# Patient Record
Sex: MALE | Race: WHITE | NOT HISPANIC OR LATINO | URBAN - METROPOLITAN AREA
[De-identification: names, ages, dates, MRNs, and addresses within clinical notes are randomized per-mention and may not be internally consistent; named-entity substitution may affect disease eponyms.]

---

## 2018-03-04 ENCOUNTER — INPATIENT (INPATIENT)
Facility: HOSPITAL | Age: 48
LOS: 1 days | Discharge: HOME | End: 2018-03-06
Attending: INTERNAL MEDICINE | Admitting: INTERNAL MEDICINE

## 2018-03-04 VITALS
OXYGEN SATURATION: 98 % | SYSTOLIC BLOOD PRESSURE: 110 MMHG | TEMPERATURE: 98 F | RESPIRATION RATE: 18 BRPM | DIASTOLIC BLOOD PRESSURE: 62 MMHG | HEART RATE: 78 BPM

## 2018-03-04 DIAGNOSIS — Z98.890 OTHER SPECIFIED POSTPROCEDURAL STATES: Chronic | ICD-10-CM

## 2018-03-04 LAB
ALBUMIN SERPL ELPH-MCNC: 4.1 G/DL — SIGNIFICANT CHANGE UP (ref 3–5.5)
ALP SERPL-CCNC: 34 U/L — SIGNIFICANT CHANGE UP (ref 30–115)
ALT FLD-CCNC: 33 U/L — SIGNIFICANT CHANGE UP (ref 0–41)
ANION GAP SERPL CALC-SCNC: 7 MMOL/L — SIGNIFICANT CHANGE UP (ref 7–14)
AST SERPL-CCNC: 27 U/L — SIGNIFICANT CHANGE UP (ref 0–41)
BASOPHILS # BLD AUTO: 0.05 K/UL — SIGNIFICANT CHANGE UP (ref 0–0.2)
BASOPHILS NFR BLD AUTO: 0.7 % — SIGNIFICANT CHANGE UP (ref 0–1)
BILIRUB SERPL-MCNC: 1 MG/DL — SIGNIFICANT CHANGE UP (ref 0.2–1.2)
BUN SERPL-MCNC: 9 MG/DL — LOW (ref 10–20)
CALCIUM SERPL-MCNC: 9.1 MG/DL — SIGNIFICANT CHANGE UP (ref 8.5–10.1)
CHLORIDE SERPL-SCNC: 104 MMOL/L — SIGNIFICANT CHANGE UP (ref 98–110)
CK MB BLD-MCNC: 1 % — SIGNIFICANT CHANGE UP (ref 0–4)
CK MB CFR SERPL CALC: 0.9 NG/ML — SIGNIFICANT CHANGE UP (ref 0.6–6.3)
CK MB CFR SERPL CALC: 1 NG/ML — SIGNIFICANT CHANGE UP (ref 0.6–6.3)
CK SERPL-CCNC: 114 U/L — SIGNIFICANT CHANGE UP (ref 0–225)
CO2 SERPL-SCNC: 28 MMOL/L — SIGNIFICANT CHANGE UP (ref 17–32)
CREAT SERPL-MCNC: 1 MG/DL — SIGNIFICANT CHANGE UP (ref 0.7–1.5)
EOSINOPHIL # BLD AUTO: 0.05 K/UL — SIGNIFICANT CHANGE UP (ref 0–0.7)
EOSINOPHIL NFR BLD AUTO: 0.7 % — SIGNIFICANT CHANGE UP (ref 0–8)
GLUCOSE SERPL-MCNC: 108 MG/DL — SIGNIFICANT CHANGE UP (ref 70–110)
HCT VFR BLD CALC: 43.4 % — SIGNIFICANT CHANGE UP (ref 42–52)
HGB BLD-MCNC: 15.2 G/DL — SIGNIFICANT CHANGE UP (ref 14–18)
IMM GRANULOCYTES NFR BLD AUTO: 0.3 % — SIGNIFICANT CHANGE UP (ref 0.1–0.3)
LYMPHOCYTES # BLD AUTO: 1.66 K/UL — SIGNIFICANT CHANGE UP (ref 1.2–3.4)
LYMPHOCYTES # BLD AUTO: 24 % — SIGNIFICANT CHANGE UP (ref 20.5–51.1)
MCHC RBC-ENTMCNC: 29.9 PG — SIGNIFICANT CHANGE UP (ref 27–31)
MCHC RBC-ENTMCNC: 35 G/DL — SIGNIFICANT CHANGE UP (ref 32–37)
MCV RBC AUTO: 85.4 FL — SIGNIFICANT CHANGE UP (ref 80–94)
MONOCYTES # BLD AUTO: 0.6 K/UL — SIGNIFICANT CHANGE UP (ref 0.1–0.6)
MONOCYTES NFR BLD AUTO: 8.7 % — SIGNIFICANT CHANGE UP (ref 1.7–9.3)
NEUTROPHILS # BLD AUTO: 4.55 K/UL — SIGNIFICANT CHANGE UP (ref 1.4–6.5)
NEUTROPHILS NFR BLD AUTO: 65.6 % — SIGNIFICANT CHANGE UP (ref 42.2–75.2)
PLATELET # BLD AUTO: 248 K/UL — SIGNIFICANT CHANGE UP (ref 130–400)
POTASSIUM SERPL-MCNC: 4.2 MMOL/L — SIGNIFICANT CHANGE UP (ref 3.5–5)
POTASSIUM SERPL-SCNC: 4.2 MMOL/L — SIGNIFICANT CHANGE UP (ref 3.5–5)
PROT SERPL-MCNC: 6.5 G/DL — SIGNIFICANT CHANGE UP (ref 6–8)
RBC # BLD: 5.08 M/UL — SIGNIFICANT CHANGE UP (ref 4.7–6.1)
RBC # FLD: 12.4 % — SIGNIFICANT CHANGE UP (ref 11.5–14.5)
SODIUM SERPL-SCNC: 139 MMOL/L — SIGNIFICANT CHANGE UP (ref 135–146)
TROPONIN I SERPL-MCNC: <0.02 NG/ML — SIGNIFICANT CHANGE UP (ref 0–0.05)
TROPONIN I SERPL-MCNC: <0.02 NG/ML — SIGNIFICANT CHANGE UP (ref 0–0.05)
WBC # BLD: 6.93 K/UL — SIGNIFICANT CHANGE UP (ref 4.8–10.8)
WBC # FLD AUTO: 6.93 K/UL — SIGNIFICANT CHANGE UP (ref 4.8–10.8)

## 2018-03-04 RX ORDER — DILTIAZEM HCL 120 MG
180 CAPSULE, EXT RELEASE 24 HR ORAL DAILY
Qty: 0 | Refills: 0 | Status: DISCONTINUED | OUTPATIENT
Start: 2018-03-04 | End: 2018-03-06

## 2018-03-04 RX ORDER — ATORVASTATIN CALCIUM 80 MG/1
40 TABLET, FILM COATED ORAL AT BEDTIME
Qty: 0 | Refills: 0 | Status: DISCONTINUED | OUTPATIENT
Start: 2018-03-04 | End: 2018-03-06

## 2018-03-04 RX ORDER — ENOXAPARIN SODIUM 100 MG/ML
40 INJECTION SUBCUTANEOUS EVERY 24 HOURS
Qty: 0 | Refills: 0 | Status: DISCONTINUED | OUTPATIENT
Start: 2018-03-04 | End: 2018-03-06

## 2018-03-04 RX ORDER — ASPIRIN/CALCIUM CARB/MAGNESIUM 324 MG
81 TABLET ORAL DAILY
Qty: 0 | Refills: 0 | Status: DISCONTINUED | OUTPATIENT
Start: 2018-03-04 | End: 2018-03-06

## 2018-03-04 RX ORDER — ASPIRIN/CALCIUM CARB/MAGNESIUM 324 MG
0 TABLET ORAL
Qty: 0 | Refills: 0 | COMMUNITY

## 2018-03-04 RX ORDER — LOSARTAN POTASSIUM 100 MG/1
25 TABLET, FILM COATED ORAL DAILY
Qty: 0 | Refills: 0 | Status: DISCONTINUED | OUTPATIENT
Start: 2018-03-04 | End: 2018-03-06

## 2018-03-04 RX ORDER — ISOSORBIDE MONONITRATE 60 MG/1
60 TABLET, EXTENDED RELEASE ORAL DAILY
Qty: 0 | Refills: 0 | Status: DISCONTINUED | OUTPATIENT
Start: 2018-03-04 | End: 2018-03-06

## 2018-03-04 RX ORDER — ACETAMINOPHEN 500 MG
650 TABLET ORAL EVERY 6 HOURS
Qty: 0 | Refills: 0 | Status: DISCONTINUED | OUTPATIENT
Start: 2018-03-04 | End: 2018-03-06

## 2018-03-04 RX ORDER — SODIUM CHLORIDE 9 MG/ML
3 INJECTION INTRAMUSCULAR; INTRAVENOUS; SUBCUTANEOUS ONCE
Qty: 0 | Refills: 0 | Status: COMPLETED | OUTPATIENT
Start: 2018-03-04 | End: 2018-03-04

## 2018-03-04 RX ORDER — NITROGLYCERIN 6.5 MG
0.4 CAPSULE, EXTENDED RELEASE ORAL
Qty: 0 | Refills: 0 | Status: DISCONTINUED | OUTPATIENT
Start: 2018-03-04 | End: 2018-03-06

## 2018-03-04 RX ORDER — ACETAMINOPHEN 500 MG
650 TABLET ORAL ONCE
Qty: 0 | Refills: 0 | Status: COMPLETED | OUTPATIENT
Start: 2018-03-04 | End: 2018-03-04

## 2018-03-04 RX ORDER — PANTOPRAZOLE SODIUM 20 MG/1
40 TABLET, DELAYED RELEASE ORAL
Qty: 0 | Refills: 0 | Status: DISCONTINUED | OUTPATIENT
Start: 2018-03-04 | End: 2018-03-06

## 2018-03-04 RX ADMIN — ENOXAPARIN SODIUM 40 MILLIGRAM(S): 100 INJECTION SUBCUTANEOUS at 22:32

## 2018-03-04 RX ADMIN — SODIUM CHLORIDE 3 MILLILITER(S): 9 INJECTION INTRAMUSCULAR; INTRAVENOUS; SUBCUTANEOUS at 15:28

## 2018-03-04 RX ADMIN — Medication 650 MILLIGRAM(S): at 15:04

## 2018-03-04 NOTE — H&P ADULT - HISTORY OF PRESENT ILLNESS
48 yo M with HTN, DM II, DLD and questionable CAD presented with acute onset chest pain that started around noon today while getting ready for work.  pt describes the pain as pulling in nature, left sided, non radiating, partially relieved with nitro, no associated N/V/diaphoresis. pt denies any cough, palpitations or syncopal episodes.  pt reports having similar pain in the past. he had a L heart cath 2 yrs ago in [facility] and revealed "no coronary obstruction but reduced flow", no stents were placed. pt is compliant with his meds. he denies any PERDOMO or exertional chest pain  ROS negative

## 2018-03-04 NOTE — ED PROVIDER NOTE - PHYSICAL EXAMINATION
CONSTITUTIONAL: Well-developed; well-nourished; in no acute distress.   SKIN: warm, dry  HEAD: Normocephalic; atraumatic.  EYES: no conj injection  ENT: No nasal discharge; airway clear.  NECK: Supple; non tender.  CARD: S1, S2 normal; no murmurs, gallops, or rubs. Regular rate and rhythm.   RESP: No wheezes, rales or rhonchi.  ABD: soft ntnd  EXT: Normal ROM.  No clubbing, cyanosis or edema.   NEURO: Alert, oriented, grossly unremarkable  PSYCH: Cooperative, appropriate.

## 2018-03-04 NOTE — ED PROVIDER NOTE - NS ED ROS FT
Gen: No fevers, chills  Eyes:  No visual changes, eye pain or discharge.  ENMT:  No hearing changes, pain. No neck pain or stiffness.  Cardiac:  see HPI  Respiratory:  No cough or respiratory distress.  GI:  No nausea, vomiting, diarrhea or abdominal pain.  :  No dysuria, frequency or burning.  MS:  No myalgia, muscle weakness, joint pain or back pain.  Neuro:  No headache or weakness.  No LOC.  Skin:  No skin rash.   Endocrine: see HPI

## 2018-03-04 NOTE — ED PROVIDER NOTE - PROGRESS NOTE DETAILS
Discussed pt status with Dr. Duran, who agreed to admit him to his service, and requested Dr. Craft to be consulted while inpatient.  MAR informed.

## 2018-03-04 NOTE — H&P ADULT - NSHPPHYSICALEXAM_GEN_ALL_CORE
· BP: 110/62  · HR: 78 /min  · RR 18 /min  · Temp: 98.1 Degrees F  · SpO2 (%)	98 % on room air · BP: 110/62  · HR: 78 /min  · RR 18 /min  · Temp: 98.1 Degrees F  · SpO2 98 % on room air

## 2018-03-04 NOTE — H&P ADULT - ASSESSMENT
48 yo M with HTN, DM II, DLD and questionable CAD presented with acute onset pulling like, chest pain that started around noon today     # Acute chest pain  R/u ACS (unstable angina)  1st set CE neg/ EKG no ST changes  tele monitoring  fu serial EKG and repeat Milagro  nitro sublingual PRN  c/w ASA, lipitor, IMDUR and CCB  cardiology evaluation (Dr Craft) for possible PCI vs stress testing     # HTN  c/w home meds     # DM II  check Hba1c  check FSs. start insulin if >200  pt prefers to continue his home meds. will bring in am     # DLD  c/w lipitor.  check lipid profile    # GI/DVT ppx:   protonix/ lovenox    # Dispo:  from home  fully functional  full code

## 2018-03-04 NOTE — ED PROVIDER NOTE - ATTENDING CONTRIBUTION TO CARE
47 year old male, pmhx of HTN, DLD, DM, comes in with complaint of chest pain, pain is dull, left sided, non radiating, started at 12:30 while sitting down, no n/v/d, no fever, no trauma.     Exam: Well appearing, no acute distress, AAO x 3, sitting up and providing history, EOMI, PERRL 3mm bilateral, no nystagmus, HEENT Unremarkable, + moist mucous membranes, no pooling of secretions, no jvd, + full passive rom in neck, negative Kernig, negative Brudzinski, s1s2, no mrg, rrr, + symmetric bilateral pulses, ctabl, no wrr, good air movement overall, no pulsatile abdominal mass, abd soft, nt nd, no rebound, no guarding, no signs of peritonitis, no cva tenderness, no rash, no leg edema, dp and pt pulses intact. No calf pain, swelling or erythema, Ambulatory. Strength intact symmetrically. CN 2-12 grossly intact, GCS 15. P: labs, imaging, tx, reassess

## 2018-03-04 NOTE — ED ADULT NURSE NOTE - CHPI ED SYMPTOMS NEG
no cough/no fever/no diaphoresis/no dizziness/no shortness of breath/no chills/no syncope/no nausea/no back pain/no vomiting

## 2018-03-04 NOTE — H&P ADULT - ATTENDING COMMENTS
H&P reviewed.  Spoke to and examined patient. Patient had similar cardiac symptoms prior to his last Cardiac Cath in June 2016. At that time there was non obstructive low flow disease.  Spoke to Dr. Craft. Will see patient and review cath report from [facility] prior to additional testing.

## 2018-03-04 NOTE — ED PROVIDER NOTE - OBJECTIVE STATEMENT
48 yo m w hx of DM and CAD on asa 81, followed by Dr. Winn, p/w left sided chest pain/pressure that began while getting ready for work 2 hours PTA.  No sob, no radiation of pain to jaw, arms or back.  No n/v or diaphoresis.  No leg swelling or recent travel, no hormonal replacement therapy.  Took asa 81 x4 PTA.  Was given 1 NTG spray with moderate relief of symptoms.  States he had cath 2 years ago at [facility], which showed "some reduced flow" but no stents placed at the time.

## 2018-03-04 NOTE — H&P ADULT - NSHPLABSRESULTS_GEN_ALL_CORE
- CBC:                      15.2   6.93  )-----------( 248               43.4      - BMP:    139  |  104  |  9<L  ----------------------------<  108  4.2   |  28  |  1.0    Ca    9.1     TPro  6.5  /  Alb  4.1  /  TBili  1.0  /  DBili  x   /  AST  27  /  ALT  33  /  AlkPhos  34      - CARDIAC MARKERS ( 04 Mar 2018 15:23 )  trop: 0.02 ng/mL  CK/: 114  Ckmb: 1.0 ng/mL    - 12 Lead ECG:  Normal sinus rhythm  Nonspecific T wave abnormality - CBC:                      15.2   6.93  )-----------( 248               43.4      - BMP:    139  |  104  |  9<L  ----------------------------<  108  4.2   |  28  |  1.0    Ca    9.1     TPro  6.5  /  Alb  4.1  /  TBili  1.0  /  DBili  x   /  AST  27  /  ALT  33  /  AlkPhos  34      - CARDIAC MARKERS ( 04 Mar 2018 15:23 )  trop: 0.02 ng/mL  CK/: 114  Ckmb: 1.0 ng/mL    - 12 Lead ECG:  Normal sinus rhythm  Nonspecific T wave abnormality     - CXR:  no effusions/ opacities  fu official report

## 2018-03-05 LAB
ANION GAP SERPL CALC-SCNC: 7 MMOL/L — SIGNIFICANT CHANGE UP (ref 7–14)
BASOPHILS # BLD AUTO: 0.07 K/UL — SIGNIFICANT CHANGE UP (ref 0–0.2)
BASOPHILS NFR BLD AUTO: 0.9 % — SIGNIFICANT CHANGE UP (ref 0–1)
BUN SERPL-MCNC: 10 MG/DL — SIGNIFICANT CHANGE UP (ref 10–20)
CALCIUM SERPL-MCNC: 8.9 MG/DL — SIGNIFICANT CHANGE UP (ref 8.5–10.1)
CHLORIDE SERPL-SCNC: 106 MMOL/L — SIGNIFICANT CHANGE UP (ref 98–110)
CHOLEST SERPL-MCNC: 113 MG/DL — SIGNIFICANT CHANGE UP (ref 100–200)
CK MB CFR SERPL CALC: 0.7 NG/ML — SIGNIFICANT CHANGE UP (ref 0.6–6.3)
CO2 SERPL-SCNC: 27 MMOL/L — SIGNIFICANT CHANGE UP (ref 17–32)
CREAT SERPL-MCNC: 0.9 MG/DL — SIGNIFICANT CHANGE UP (ref 0.7–1.5)
EOSINOPHIL # BLD AUTO: 0.17 K/UL — SIGNIFICANT CHANGE UP (ref 0–0.7)
EOSINOPHIL NFR BLD AUTO: 2.3 % — SIGNIFICANT CHANGE UP (ref 0–8)
ESTIMATED AVERAGE GLUCOSE: 123 MG/DL — HIGH (ref 68–114)
GLUCOSE SERPL-MCNC: 92 MG/DL — SIGNIFICANT CHANGE UP (ref 70–110)
HBA1C BLD-MCNC: 5.9 % — HIGH (ref 4–5.6)
HCT VFR BLD CALC: 42.6 % — SIGNIFICANT CHANGE UP (ref 42–52)
HDLC SERPL-MCNC: 34 MG/DL — LOW (ref 40–60)
HGB BLD-MCNC: 14.9 G/DL — SIGNIFICANT CHANGE UP (ref 14–18)
IMM GRANULOCYTES NFR BLD AUTO: 0.3 % — SIGNIFICANT CHANGE UP (ref 0.1–0.3)
LIPID PNL WITH DIRECT LDL SERPL: 68 MG/DL — SIGNIFICANT CHANGE UP (ref 50–100)
LYMPHOCYTES # BLD AUTO: 2.85 K/UL — SIGNIFICANT CHANGE UP (ref 1.2–3.4)
LYMPHOCYTES # BLD AUTO: 38.4 % — SIGNIFICANT CHANGE UP (ref 20.5–51.1)
MCHC RBC-ENTMCNC: 30 PG — SIGNIFICANT CHANGE UP (ref 27–31)
MCHC RBC-ENTMCNC: 35 G/DL — SIGNIFICANT CHANGE UP (ref 32–37)
MCV RBC AUTO: 85.7 FL — SIGNIFICANT CHANGE UP (ref 80–94)
MONOCYTES # BLD AUTO: 0.74 K/UL — HIGH (ref 0.1–0.6)
MONOCYTES NFR BLD AUTO: 10 % — HIGH (ref 1.7–9.3)
NEUTROPHILS # BLD AUTO: 3.57 K/UL — SIGNIFICANT CHANGE UP (ref 1.4–6.5)
NEUTROPHILS NFR BLD AUTO: 48.1 % — SIGNIFICANT CHANGE UP (ref 42.2–75.2)
PLATELET # BLD AUTO: 224 K/UL — SIGNIFICANT CHANGE UP (ref 130–400)
POTASSIUM SERPL-MCNC: 4.2 MMOL/L — SIGNIFICANT CHANGE UP (ref 3.5–5)
POTASSIUM SERPL-SCNC: 4.2 MMOL/L — SIGNIFICANT CHANGE UP (ref 3.5–5)
RBC # BLD: 4.97 M/UL — SIGNIFICANT CHANGE UP (ref 4.7–6.1)
RBC # FLD: 12.8 % — SIGNIFICANT CHANGE UP (ref 11.5–14.5)
SODIUM SERPL-SCNC: 140 MMOL/L — SIGNIFICANT CHANGE UP (ref 135–146)
TOTAL CHOLESTEROL/HDL RATIO MEASUREMENT: 3.3 RATIO — LOW (ref 4–5.5)
TRIGL SERPL-MCNC: 66 MG/DL — SIGNIFICANT CHANGE UP (ref 40–150)
TROPONIN I SERPL-MCNC: <0.02 NG/ML — SIGNIFICANT CHANGE UP (ref 0–0.05)
WBC # BLD: 7.42 K/UL — SIGNIFICANT CHANGE UP (ref 4.8–10.8)
WBC # FLD AUTO: 7.42 K/UL — SIGNIFICANT CHANGE UP (ref 4.8–10.8)

## 2018-03-05 RX ORDER — INFLUENZA VIRUS VACCINE 15; 15; 15; 15 UG/.5ML; UG/.5ML; UG/.5ML; UG/.5ML
0.5 SUSPENSION INTRAMUSCULAR ONCE
Qty: 0 | Refills: 0 | Status: DISCONTINUED | OUTPATIENT
Start: 2018-03-05 | End: 2018-03-06

## 2018-03-05 RX ORDER — LANOLIN ALCOHOL/MO/W.PET/CERES
3 CREAM (GRAM) TOPICAL AT BEDTIME
Qty: 0 | Refills: 0 | Status: DISCONTINUED | OUTPATIENT
Start: 2018-03-05 | End: 2018-03-06

## 2018-03-05 RX ADMIN — Medication 81 MILLIGRAM(S): at 11:46

## 2018-03-05 RX ADMIN — PANTOPRAZOLE SODIUM 40 MILLIGRAM(S): 20 TABLET, DELAYED RELEASE ORAL at 06:53

## 2018-03-05 RX ADMIN — LOSARTAN POTASSIUM 25 MILLIGRAM(S): 100 TABLET, FILM COATED ORAL at 11:46

## 2018-03-05 RX ADMIN — Medication 180 MILLIGRAM(S): at 11:46

## 2018-03-05 RX ADMIN — ENOXAPARIN SODIUM 40 MILLIGRAM(S): 100 INJECTION SUBCUTANEOUS at 21:38

## 2018-03-05 RX ADMIN — Medication 3 MILLIGRAM(S): at 22:49

## 2018-03-05 RX ADMIN — ATORVASTATIN CALCIUM 40 MILLIGRAM(S): 80 TABLET, FILM COATED ORAL at 21:39

## 2018-03-05 NOTE — CONSULT NOTE ADULT - ASSESSMENT
1. Chest pain, atypical  2. DM  3. HTN    - no ACS  - c/w current medication regimen  - obtain OP medical records including angiogram  - consider increasing imdur &/or starting on ranexa for improvement in symptoms  - r/o other etiologies which could possibly lead to symptoms  - if angiogram shows no significant lesion, no futher IP cardiac workup reqd, may f/u cardiology as OP. 1. Chest pain, atypical  2. DM  3. HTN    - no ACS  - c/w current medication regimen  - obtain OP medical records including angiogram  - consider increasing imdur &/or starting on ranexa for improvement in symptoms  - r/o other etiologies which could possibly lead to symptoms  - if angiogram shows no significant lesion, would get EST and may f/u cardiology as OP.

## 2018-03-06 ENCOUNTER — TRANSCRIPTION ENCOUNTER (OUTPATIENT)
Age: 48
End: 2018-03-06

## 2018-03-06 VITALS
DIASTOLIC BLOOD PRESSURE: 59 MMHG | HEART RATE: 73 BPM | RESPIRATION RATE: 18 BRPM | TEMPERATURE: 99 F | SYSTOLIC BLOOD PRESSURE: 102 MMHG

## 2018-03-06 DIAGNOSIS — I10 ESSENTIAL (PRIMARY) HYPERTENSION: ICD-10-CM

## 2018-03-06 DIAGNOSIS — E78.00 PURE HYPERCHOLESTEROLEMIA, UNSPECIFIED: ICD-10-CM

## 2018-03-06 DIAGNOSIS — R07.9 CHEST PAIN, UNSPECIFIED: ICD-10-CM

## 2018-03-06 RX ORDER — LANOLIN ALCOHOL/MO/W.PET/CERES
1 CREAM (GRAM) TOPICAL
Qty: 0 | Refills: 0 | DISCHARGE
Start: 2018-03-06

## 2018-03-06 RX ADMIN — Medication 81 MILLIGRAM(S): at 10:59

## 2018-03-06 RX ADMIN — ISOSORBIDE MONONITRATE 60 MILLIGRAM(S): 60 TABLET, EXTENDED RELEASE ORAL at 10:58

## 2018-03-06 RX ADMIN — PANTOPRAZOLE SODIUM 40 MILLIGRAM(S): 20 TABLET, DELAYED RELEASE ORAL at 06:01

## 2018-03-06 RX ADMIN — LOSARTAN POTASSIUM 25 MILLIGRAM(S): 100 TABLET, FILM COATED ORAL at 05:54

## 2018-03-06 RX ADMIN — Medication 180 MILLIGRAM(S): at 05:54

## 2018-03-06 NOTE — PROGRESS NOTE ADULT - SUBJECTIVE AND OBJECTIVE BOX
BELIA CH  47y  Male      Patient is a 47y old  Male who presents with a chief complaint of chest pain x few hours (04 Mar 2018 16:57)      INTERVAL HPI/OVERNIGHT EVENTS:  Patient feels good no chest pain. Had ETT did 10 min without any ischemia.    REVIEW OF SYSTEMS:  CONSTITUTIONAL: No fever, weight loss, or fatigue  EYES: No eye pain, visual disturbances, or discharge  ENMT:  No difficulty hearing, tinnitus, vertigo; No sinus or throat pain  NECK: No pain or stiffness  BREASTS: No pain, masses, or nipple discharge  RESPIRATORY: No cough, wheezing, chills or hemoptysis; No shortness of breath  CARDIOVASCULAR: No chest pain, palpitations, dizziness, or leg swelling  GASTROINTESTINAL: No abdominal or epigastric pain. No nausea, vomiting, or hematemesis; No diarrhea or constipation. No melena or hematochezia.  GENITOURINARY: No dysuria, frequency, hematuria, or incontinence  NEUROLOGICAL: No headaches, memory loss, loss of strength, numbness, or tremors  SKIN: No itching, burning, rashes, or lesions   LYMPH NODES: No enlarged glands  ENDOCRINE: No heat or cold intolerance; No hair loss  MUSCULOSKELETAL: No joint pain or swelling; No muscle, back, or extremity pain  PSYCHIATRIC: No depression, anxiety, mood swings, or difficulty sleeping  HEME/LYMPH: No easy bruising, or bleeding gums  ALLERY AND IMMUNOLOGIC: No hives or eczema  FAMILY HISTORY:  No pertinent family history in first degree relatives    T(C): 37.1 (03-06-18 @ 05:39), Max: 37.1 (03-06-18 @ 05:39)  HR: 73 (03-06-18 @ 05:39) (71 - 80)  BP: 102/59 (03-06-18 @ 05:39) (101/59 - 107/70)  RR: 18 (03-06-18 @ 05:39) (18 - 18)  SpO2: 97% (03-05-18 @ 23:40) (97% - 100%)  Wt(kg): --Vital Signs Last 24 Hrs  T(C): 37.1 (06 Mar 2018 05:39), Max: 37.1 (06 Mar 2018 05:39)  T(F): 98.8 (06 Mar 2018 05:39), Max: 98.8 (06 Mar 2018 05:39)  HR: 73 (06 Mar 2018 05:39) (71 - 80)  BP: 102/59 (06 Mar 2018 05:39) (101/59 - 107/70)  BP(mean): --  RR: 18 (06 Mar 2018 05:39) (18 - 18)  SpO2: 97% (05 Mar 2018 23:40) (97% - 100%)    PHYSICAL EXAM:  GENERAL: NAD, well-groomed, well-developed  HEAD:  Atraumatic, Normocephalic  EYES: EOMI, PERRLA, conjunctiva and sclera clear  ENMT: No tonsillar erythema, exudates, or enlargement; Moist mucous membranes, Good dentition, No lesions  NECK: Supple, No JVD, Normal thyroid  NERVOUS SYSTEM:  Alert & Oriented X3, Good concentration; Motor Strength 5/5 B/L upper and lower extremities; DTRs 2+ intact and symmetric  CHEST/LUNG: Clear to percussion bilaterally; No rales, rhonchi, wheezing, or rubs  HEART: Regular rate and rhythm; No murmurs, rubs, or gallops  ABDOMEN: Soft, Nontender, Nondistended; Bowel sounds present  EXTREMITIES:  2+ Peripheral Pulses, No clubbing, cyanosis, or edema  LYMPH: No lymphadenopathy noted  SKIN: No rashes or lesions    Consultant(s) Notes Reviewed:  [x ] YES  [ ] NO  Care Discussed with Consultants/Other Providers [ x] YES  [ ] NO    LABS:03-05    140  |  106  |  10  ----------------------------<  92  4.2   |  27  |  0.9    Ca    8.9      05 Mar 2018 08:24    TPro  6.5  /  Alb  4.1  /  TBili  1.0  /  DBili  x   /  AST  27  /  ALT  33  /  AlkPhos  34  03-04                          14.9   7.42  )-----------( 224      ( 05 Mar 2018 08:24 )             42.6         RADIOLOGY & ADDITIONAL TESTS:    Imaging Personally Reviewed:  [ ] YES  [ ] NO  acetaminophen   Tablet. 650 milliGRAM(s) Oral every 6 hours PRN  aspirin enteric coated 81 milliGRAM(s) Oral daily  atorvastatin 40 milliGRAM(s) Oral at bedtime  diltiazem    milliGRAM(s) Oral daily  enoxaparin Injectable 40 milliGRAM(s) SubCutaneous every 24 hours  influenza   Vaccine 0.5 milliLiter(s) IntraMuscular once  isosorbide   mononitrate ER Tablet (IMDUR) 60 milliGRAM(s) Oral daily  losartan 25 milliGRAM(s) Oral daily  melatonin 3 milliGRAM(s) Oral at bedtime PRN  nitroglycerin     SubLingual 0.4 milliGRAM(s) SubLingual every 5 minutes PRN  pantoprazole    Tablet 40 milliGRAM(s) Oral before breakfast      HEALTH ISSUES - PROBLEM Dx:
Patient is a 47y old  Male who presents with a chief complaint of chest pain x few hours (04 Mar 2018 16:57)      OVERNIGHT EVENTS: no acute events    Vital Signs Last 24 Hrs  T(C): 36.3 (05 Mar 2018 07:44), Max: 37 (04 Mar 2018 22:30)  T(F): 97.4 (05 Mar 2018 07:44), Max: 98.6 (04 Mar 2018 22:30)  HR: 71 (05 Mar 2018 07:44) (63 - 78)  BP: 90/52 (05 Mar 2018 07:44) (90/52 - 110/62)  BP(mean): --  RR: 18 (05 Mar 2018 07:44) (17 - 18)  SpO2: 99% (05 Mar 2018 07:44) (97% - 100%)    PHYSICAL EXAM:      Constitutional: well developed and well nourished    Respiratory: lungs B/L clear on auscultation    Cardiovascular: S1S2 normal, no murmur heard    Gastrointestinal: Abd soft, non distended, non tender, BS+    Extremities: No pedal edema    Neurological: AAOX3, No FND          I&O's Summary                            15.2   6.93  )-----------( 248      ( 04 Mar 2018 15:23 )             43.4     03-04    139  |  104  |  9<L>  ----------------------------<  108  4.2   |  28  |  1.0    Ca    9.1      04 Mar 2018 15:23    TPro  6.5  /  Alb  4.1  /  TBili  1.0  /  DBili  x   /  AST  27  /  ALT  33  /  AlkPhos  34  03-04    CARDIAC MARKERS ( 04 Mar 2018 21:06 )  <0.02 ng/mL / x     / x     / x     / 0.9 ng/mL  CARDIAC MARKERS ( 04 Mar 2018 15:23 )  <0.02 ng/mL / x     / 114 U/L / x     / 1.0 ng/mL      CAPILLARY BLOOD GLUCOSE              MEDICATIONS  (STANDING):  aspirin enteric coated 81 milliGRAM(s) Oral daily  atorvastatin 40 milliGRAM(s) Oral at bedtime  diltiazem    milliGRAM(s) Oral daily  enoxaparin Injectable 40 milliGRAM(s) SubCutaneous every 24 hours  isosorbide   mononitrate ER Tablet (IMDUR) 60 milliGRAM(s) Oral daily  losartan 25 milliGRAM(s) Oral daily  pantoprazole    Tablet 40 milliGRAM(s) Oral before breakfast    MEDICATIONS  (PRN):  acetaminophen   Tablet. 650 milliGRAM(s) Oral every 6 hours PRN Mild Pain (1 - 3)  nitroglycerin     SubLingual 0.4 milliGRAM(s) SubLingual every 5 minutes PRN Chest Pain

## 2018-03-06 NOTE — PROGRESS NOTE ADULT - ASSESSMENT
Patient has atypical chest pain with neg stress test.  Patient advised to take all his meds.  Advised not to do any strenuous exercise or shovel snow.  May return back to work next week.
46 yo M with HTN, DM II, DLD and questionable CAD presented with acute onset pulling like, chest pain that started around noon today     # Acute chest pain Atypical R/o ACS (unstable angina)  2 sets  CE neg/ EKG no ST changes  tele monitoring  fu serial EKG   nitro sublingual PRN  c/w ASA, lipitor, IMDUR and CCB  cardiology f/u  need to get angiogram report from his cardiologist      # HTN  c/w home meds     # DM II  check Hba1c  check FSs. start insulin if >200       # DLD  c/w lipitor.  check lipid profile    # GI/DVT ppx:   protonix/ lovenox    # Dispo:  from home  fully functional  full code

## 2018-03-06 NOTE — DISCHARGE NOTE ADULT - PATIENT PORTAL LINK FT
You can access the [facility], offered by [facility], by registering with the following website: [email or web address]

## 2018-03-06 NOTE — DISCHARGE NOTE ADULT - HOSPITAL COURSE
47M presented with with chest pain, described as pulling in nature, non radiating, minimally relieved with nitro and non-exertional. Cardiac enzymes were negative x3, and patient had a negative exercise stress test. BP is well controlled, will be discharged on current regimen.

## 2018-03-06 NOTE — DISCHARGE NOTE ADULT - CARE PLAN
Principal Discharge DX:	Chest pain  Goal:	Resolution of symptoms Principal Discharge DX:	Chest pain  Goal:	Resolution of symptoms  Assessment and plan of treatment:	Continue current medications  Follow up with cardiology and your primary care physician

## 2018-03-06 NOTE — DISCHARGE NOTE ADULT - ADDITIONAL INSTRUCTIONS
Please follow up with your physicians  Continue current medical regimen  If symptoms recur please call your physician

## 2018-03-06 NOTE — DISCHARGE NOTE ADULT - MEDICATION SUMMARY - MEDICATIONS TO TAKE
I will START or STAY ON the medications listed below when I get home from the hospital:    Aspirin Low Dose 81 mg oral delayed release tablet  -- 1 tab(s) by mouth once a day  -- Indication: For CAD (coronary artery disease)    losartan 25 mg oral tablet  -- 1 tab(s) by mouth once a day  -- Indication: For HTN    isosorbide mononitrate 60 mg oral tablet, extended release  -- 1 tab(s) by mouth once a day (in the morning)  -- Indication: For HTN    Cartia  mg/24 hours oral capsule, extended release  -- 1 cap(s) by mouth once a day  -- Indication: For Essential hypertension    Oseni 25 mg-45 mg oral tablet  -- 1 tab(s) by mouth once a day  -- Indication: For DM    Trulicity Pen 0.75 mg/0.5 mL subcutaneous solution  -- subcutaneous once a week (saturday or sunday)  -- Indication: For DM    Lipitor 40 mg oral tablet  -- 1 tab(s) by mouth once a day  -- Indication: For Dyslipidemia    melatonin 3 mg oral tablet  -- 1 tab(s) by mouth once a day (at bedtime), As needed, Insomnia  -- Indication: For Insomnia    pantoprazole 40 mg oral delayed release tablet  -- 1 tab(s) by mouth once a day  -- Indication: For GERD

## 2018-03-06 NOTE — DISCHARGE NOTE ADULT - PLAN OF CARE
Resolution of symptoms Continue current medications  Follow up with cardiology and your primary care physician

## 2018-03-06 NOTE — DISCHARGE NOTE ADULT - CARE PROVIDER_API CALL
Trung Duran), Internal Medicine  [address] [place] [address]  Phone: [phone]  Fax: [phone]    Kvng Winn), Cardiology  [address] [place] [address]  Phone: [phone]  Fax: [phone]

## 2018-03-09 DIAGNOSIS — E78.00 PURE HYPERCHOLESTEROLEMIA, UNSPECIFIED: ICD-10-CM

## 2018-03-09 DIAGNOSIS — E11.9 TYPE 2 DIABETES MELLITUS WITHOUT COMPLICATIONS: ICD-10-CM

## 2018-03-09 DIAGNOSIS — I10 ESSENTIAL (PRIMARY) HYPERTENSION: ICD-10-CM

## 2018-03-09 DIAGNOSIS — Z79.01 LONG TERM (CURRENT) USE OF ANTICOAGULANTS: ICD-10-CM

## 2018-03-09 DIAGNOSIS — R07.89 OTHER CHEST PAIN: ICD-10-CM

## 2018-03-09 DIAGNOSIS — Z79.84 LONG TERM (CURRENT) USE OF ORAL HYPOGLYCEMIC DRUGS: ICD-10-CM

## 2018-03-09 DIAGNOSIS — F17.211 NICOTINE DEPENDENCE, CIGARETTES, IN REMISSION: ICD-10-CM

## 2018-03-09 DIAGNOSIS — Z79.82 LONG TERM (CURRENT) USE OF ASPIRIN: ICD-10-CM

## 2021-04-09 PROBLEM — I25.10 ATHEROSCLEROTIC HEART DISEASE OF NATIVE CORONARY ARTERY WITHOUT ANGINA PECTORIS: Chronic | Status: ACTIVE | Noted: 2018-03-04

## 2021-04-09 PROBLEM — E11.9 TYPE 2 DIABETES MELLITUS WITHOUT COMPLICATIONS: Chronic | Status: ACTIVE | Noted: 2018-03-04

## 2021-04-09 PROBLEM — E78.00 PURE HYPERCHOLESTEROLEMIA, UNSPECIFIED: Chronic | Status: ACTIVE | Noted: 2018-03-04

## 2021-04-09 PROBLEM — I10 ESSENTIAL (PRIMARY) HYPERTENSION: Chronic | Status: ACTIVE | Noted: 2018-03-04

## 2021-04-22 PROBLEM — Z00.00 ENCOUNTER FOR PREVENTIVE HEALTH EXAMINATION: Status: ACTIVE | Noted: 2021-04-22

## 2021-05-12 ENCOUNTER — NON-APPOINTMENT (OUTPATIENT)
Age: 51
End: 2021-05-12

## 2021-05-13 ENCOUNTER — APPOINTMENT (OUTPATIENT)
Dept: SURGERY | Facility: CLINIC | Age: 51
End: 2021-05-13
Payer: COMMERCIAL

## 2021-05-13 ENCOUNTER — NON-APPOINTMENT (OUTPATIENT)
Age: 51
End: 2021-05-13

## 2021-05-13 VITALS — BODY MASS INDEX: 26.14 KG/M2 | WEIGHT: 193 LBS | HEIGHT: 72 IN

## 2021-05-13 DIAGNOSIS — K40.90 UNILATERAL INGUINAL HERNIA, W/OUT OBSTRUCTION OR GANGRENE, NOT SPECIFIED AS RECURRENT: ICD-10-CM

## 2021-05-13 PROCEDURE — 99242 OFF/OP CONSLTJ NEW/EST SF 20: CPT

## 2021-05-13 PROCEDURE — 99072 ADDL SUPL MATRL&STAF TM PHE: CPT

## 2021-05-13 NOTE — ASSESSMENT
[FreeTextEntry1] : Melchor is a pleasant 50-year-old gentleman with a past medical history significant for hypertension, diabetes, hypercholesterolemia, GERD along with multiple herniated discs in his back associated with chronic pain who presents to the office with pain and swelling in the right groin which began after helping his father move and fix a toilet in [place].\par \par Physical examination demonstrates a moderate to large tender easily reducible bulge in the right groin consistent with a large symptomatic protruding right inguinal hernia warranting surgical repair. There is no evidence of incarceration or strangulation, and the patient denies any symptoms of obstruction.  Examination of his left groin demonstrates a moderate weakness but no obvious hernia. Both testicles are normal. His umbilical examination is unremarkable. His current BMI is 26.\par \par I explained the pros and cons of surgery, as well as all risks, benefits, indications and alternatives of the procedure and the patient understood and agreed. Melchor was scheduled for the repair of his right inguinal hernia with mesh on Wednesday, June 16, 2021 under LOCAL with IV SEDATION at [facility] with presurgical testing preceding this date. He was encouraged to avoid heavy lifting and strenuous activity in the interim, of course.

## 2021-05-13 NOTE — PHYSICAL EXAM
[Normal Breath Sounds] : Normal breath sounds [No Rash or Lesion] : No rash or lesion [Alert] : alert [Calm] : calm [JVD] : no jugular venous distention  [de-identified] : healthy [de-identified] : normal [de-identified] : soft and flat abdomen\par  [de-identified] : normal testicles [de-identified] : right inguinal hernia, reducible

## 2021-05-13 NOTE — CONSULT LETTER
[Dear  ___] : Dear  [unfilled], [Courtesy Letter:] : I had the pleasure of seeing your patient, [unfilled], in my office today. [Please see my note below.] : Please see my note below. [Consult Closing:] : Thank you very much for allowing me to participate in the care of this patient.  If you have any questions, please do not hesitate to contact me. [DrNatasha  ___] : Dr. HOGAN [DrNatasha ___] : Dr. HOGAN [FreeTextEntry3] : Respectfully,\par \par Deyvi Arreguin M.D., FACS\par

## 2021-06-07 ENCOUNTER — APPOINTMENT (OUTPATIENT)
Dept: SURGERY | Facility: CLINIC | Age: 51
End: 2021-06-07

## 2021-06-13 ENCOUNTER — RX RENEWAL (OUTPATIENT)
Age: 51
End: 2021-06-13

## 2021-06-16 ENCOUNTER — APPOINTMENT (OUTPATIENT)
Dept: SURGERY | Facility: AMBULATORY SURGERY CENTER | Age: 51
End: 2021-06-16

## 2021-06-24 ENCOUNTER — APPOINTMENT (OUTPATIENT)
Dept: SURGERY | Facility: CLINIC | Age: 51
End: 2021-06-24

## 2022-06-21 ENCOUNTER — APPOINTMENT (OUTPATIENT)
Dept: ORTHOPEDIC SURGERY | Facility: CLINIC | Age: 52
End: 2022-06-21

## 2022-08-21 ENCOUNTER — INPATIENT (INPATIENT)
Facility: HOSPITAL | Age: 52
LOS: 2 days | Discharge: HOME | End: 2022-08-24
Attending: HOSPITALIST | Admitting: HOSPITALIST

## 2022-08-21 VITALS
WEIGHT: 192.02 LBS | RESPIRATION RATE: 17 BRPM | SYSTOLIC BLOOD PRESSURE: 114 MMHG | TEMPERATURE: 99 F | HEART RATE: 72 BPM | OXYGEN SATURATION: 99 % | DIASTOLIC BLOOD PRESSURE: 77 MMHG

## 2022-08-21 DIAGNOSIS — Z98.890 OTHER SPECIFIED POSTPROCEDURAL STATES: Chronic | ICD-10-CM

## 2022-08-21 LAB
ALBUMIN SERPL ELPH-MCNC: 4.3 G/DL — SIGNIFICANT CHANGE UP (ref 3.5–5.2)
ALP SERPL-CCNC: 53 U/L — SIGNIFICANT CHANGE UP (ref 30–115)
ALT FLD-CCNC: 23 U/L — SIGNIFICANT CHANGE UP (ref 0–41)
ANION GAP SERPL CALC-SCNC: 13 MMOL/L — SIGNIFICANT CHANGE UP (ref 7–14)
APPEARANCE UR: CLEAR — SIGNIFICANT CHANGE UP
AST SERPL-CCNC: 22 U/L — SIGNIFICANT CHANGE UP (ref 0–41)
BASOPHILS # BLD AUTO: 0.05 K/UL — SIGNIFICANT CHANGE UP (ref 0–0.2)
BASOPHILS NFR BLD AUTO: 0.5 % — SIGNIFICANT CHANGE UP (ref 0–1)
BILIRUB SERPL-MCNC: 0.9 MG/DL — SIGNIFICANT CHANGE UP (ref 0.2–1.2)
BILIRUB UR-MCNC: NEGATIVE — SIGNIFICANT CHANGE UP
BUN SERPL-MCNC: 14 MG/DL — SIGNIFICANT CHANGE UP (ref 10–20)
CALCIUM SERPL-MCNC: 9.3 MG/DL — SIGNIFICANT CHANGE UP (ref 8.5–10.1)
CHLORIDE SERPL-SCNC: 105 MMOL/L — SIGNIFICANT CHANGE UP (ref 98–110)
CK SERPL-CCNC: 90 U/L — SIGNIFICANT CHANGE UP (ref 0–225)
CO2 SERPL-SCNC: 25 MMOL/L — SIGNIFICANT CHANGE UP (ref 17–32)
COLOR SPEC: YELLOW — SIGNIFICANT CHANGE UP
CREAT SERPL-MCNC: 0.9 MG/DL — SIGNIFICANT CHANGE UP (ref 0.7–1.5)
DIFF PNL FLD: NEGATIVE — SIGNIFICANT CHANGE UP
EGFR: 102 ML/MIN/1.73M2 — SIGNIFICANT CHANGE UP
EOSINOPHIL # BLD AUTO: 0.1 K/UL — SIGNIFICANT CHANGE UP (ref 0–0.7)
EOSINOPHIL NFR BLD AUTO: 1.1 % — SIGNIFICANT CHANGE UP (ref 0–8)
GLUCOSE SERPL-MCNC: 113 MG/DL — HIGH (ref 70–99)
GLUCOSE UR QL: ABNORMAL
HCT VFR BLD CALC: 47.2 % — SIGNIFICANT CHANGE UP (ref 42–52)
HGB BLD-MCNC: 16.1 G/DL — SIGNIFICANT CHANGE UP (ref 14–18)
IMM GRANULOCYTES NFR BLD AUTO: 0.4 % — HIGH (ref 0.1–0.3)
KETONES UR-MCNC: ABNORMAL
LEUKOCYTE ESTERASE UR-ACNC: NEGATIVE — SIGNIFICANT CHANGE UP
LYMPHOCYTES # BLD AUTO: 2.02 K/UL — SIGNIFICANT CHANGE UP (ref 1.2–3.4)
LYMPHOCYTES # BLD AUTO: 21.7 % — SIGNIFICANT CHANGE UP (ref 20.5–51.1)
MCHC RBC-ENTMCNC: 30.3 PG — SIGNIFICANT CHANGE UP (ref 27–31)
MCHC RBC-ENTMCNC: 34.1 G/DL — SIGNIFICANT CHANGE UP (ref 32–37)
MCV RBC AUTO: 88.9 FL — SIGNIFICANT CHANGE UP (ref 80–94)
MONOCYTES # BLD AUTO: 0.71 K/UL — HIGH (ref 0.1–0.6)
MONOCYTES NFR BLD AUTO: 7.6 % — SIGNIFICANT CHANGE UP (ref 1.7–9.3)
NEUTROPHILS # BLD AUTO: 6.41 K/UL — SIGNIFICANT CHANGE UP (ref 1.4–6.5)
NEUTROPHILS NFR BLD AUTO: 68.7 % — SIGNIFICANT CHANGE UP (ref 42.2–75.2)
NITRITE UR-MCNC: NEGATIVE — SIGNIFICANT CHANGE UP
NRBC # BLD: 0 /100 WBCS — SIGNIFICANT CHANGE UP (ref 0–0)
PH UR: 7.5 — SIGNIFICANT CHANGE UP (ref 5–8)
PLATELET # BLD AUTO: 227 K/UL — SIGNIFICANT CHANGE UP (ref 130–400)
POTASSIUM SERPL-MCNC: 4.2 MMOL/L — SIGNIFICANT CHANGE UP (ref 3.5–5)
POTASSIUM SERPL-SCNC: 4.2 MMOL/L — SIGNIFICANT CHANGE UP (ref 3.5–5)
PROT SERPL-MCNC: 6.7 G/DL — SIGNIFICANT CHANGE UP (ref 6–8)
PROT UR-MCNC: SIGNIFICANT CHANGE UP
RBC # BLD: 5.31 M/UL — SIGNIFICANT CHANGE UP (ref 4.7–6.1)
RBC # FLD: 13.1 % — SIGNIFICANT CHANGE UP (ref 11.5–14.5)
SODIUM SERPL-SCNC: 143 MMOL/L — SIGNIFICANT CHANGE UP (ref 135–146)
SP GR SPEC: 1.04 — HIGH (ref 1.01–1.03)
UROBILINOGEN FLD QL: SIGNIFICANT CHANGE UP
WBC # BLD: 9.33 K/UL — SIGNIFICANT CHANGE UP (ref 4.8–10.8)
WBC # FLD AUTO: 9.33 K/UL — SIGNIFICANT CHANGE UP (ref 4.8–10.8)

## 2022-08-21 PROCEDURE — 99285 EMERGENCY DEPT VISIT HI MDM: CPT

## 2022-08-21 RX ORDER — METHOCARBAMOL 500 MG/1
1500 TABLET, FILM COATED ORAL ONCE
Refills: 0 | Status: COMPLETED | OUTPATIENT
Start: 2022-08-21 | End: 2022-08-21

## 2022-08-21 RX ORDER — KETOROLAC TROMETHAMINE 30 MG/ML
30 SYRINGE (ML) INJECTION ONCE
Refills: 0 | Status: DISCONTINUED | OUTPATIENT
Start: 2022-08-21 | End: 2022-08-21

## 2022-08-21 RX ORDER — DIAZEPAM 5 MG
10 TABLET ORAL ONCE
Refills: 0 | Status: DISCONTINUED | OUTPATIENT
Start: 2022-08-21 | End: 2022-08-21

## 2022-08-21 RX ADMIN — Medication 10 MILLIGRAM(S): at 23:26

## 2022-08-21 RX ADMIN — Medication 30 MILLIGRAM(S): at 21:29

## 2022-08-21 RX ADMIN — METHOCARBAMOL 1500 MILLIGRAM(S): 500 TABLET, FILM COATED ORAL at 21:29

## 2022-08-21 NOTE — ED PROVIDER NOTE - PROGRESS NOTE DETAILS
FF: bedside ultrasound used to measure bladder volume post void, the first time post void there was over 400c of urine in the bladder. second post void showed 360cc of urine in the bladder. discussed plan to admit for mri and neuro surg eval. agreeable. AS: pt w/ improvement in pain w/ meds but, concern for urinary retention on US. No other neurologic symptoms. Given this finding, will admit for MRI.

## 2022-08-21 NOTE — ED PROVIDER NOTE - CLINICAL SUMMARY MEDICAL DECISION MAKING FREE TEXT BOX
52 yo man w/ DM, CAD, chronic back pain secondary to multiple herniated discs here w/ back pain. States increased pain over last 2 days in setting of picking up grand kids a lot. This afternoon, pain was worse and felt like legs were going to go out. Eased self to ground and lay down on stomach. Unable to get up and was not found by finance (they don't live together) for ~ 7-8 hours. No fall, no head trauma. Back pain present prior to going to ground. Took tizanadine prior to arrival in ED.  Denies any loss of bowel/bladder continence and no recent constipation or difficulty urinating.  Denies weakness in legs  No back pain red flags: no CA, no immunosuppressants or immunocompromise, no IVDA, no bowel/bladder changes, no decr sensation in rectal area, no trauma, no fevers    wd/wn  Moderate pain distress incresased w/ movement of legs or tunring from side to side  nc/at  eomi, perrl  rrr s1s2 wnl  cta b/l  nt/nd + BS  from b/l UE w/ 5/5 strength  LE: able to flex/extend at hip and knees w/ discomfort. 5/5 strength in ankles and knees, 4+/5 in hips  Sensation intact distally, dp/pt 2+ b/l  aao X 3    52 yo man w/ acute on chronic back pain and inability to ambulate secondary to pain. Down on ground X 7-8 hours that was able to roll from side to side  check CK, Cr, UA  Analgesia  Reassess

## 2022-08-21 NOTE — ED ADULT TRIAGE NOTE - CHIEF COMPLAINT QUOTE
pt bibems from home, for a fall, hx of herniated disc. pt was on the floor for approx 8hrs , no head trauma ,AC, LOC.

## 2022-08-21 NOTE — ED PROVIDER NOTE - PHYSICAL EXAMINATION
Physical Exam    Vital Signs: I have reviewed the initial vital signs.  Constitutional: well-nourished, appears stated age, no acute distress  Eyes: Conjunctiva pink, Sclera clear  Cardiovascular: S1 and S2, regular rate, regular rhythm, well-perfused extremities, radial and pedal pulses equal and 2+ b/l.   Respiratory: unlabored respiratory effort, clear to auscultation bilaterally no wheezing, rales and rhonchi. pt is speaking full sentences. no accessory muscle use.   Gastrointestinal: soft, non-tender, nondistended abdomen, no pulsatile mass, normal bowl sounds, no rebound, no guarding  Musculoskeletal: supple neck, no lower extremity edema, no calf tenderness, (+) mid thoracic through lower lumbar midline tenderness, paraspinal lower lumbar tenderness b/l, no palpable spinal step offs.   Integumentary: warm, dry, no rash  Neurologic: awake, alert, cranial nerves II-XII grossly intact, extremities’ motor and sensory functions grossly intact. sensation intact and equal in the b/l lower extremities. pt has lower back pain with elevating b/l lower extremities. pt has 5/5 strength in the lower extremities but with pain.   Psychiatric: appropriate mood, appropriate affect

## 2022-08-21 NOTE — ED PROVIDER NOTE - OBJECTIVE STATEMENT
52 y/o male with a PMH of lumbar herniated disc L1, L2-L4, and cervical, HTN, HLD, DM type 2, and hiatal hernia presents to the ED for evaluation of back pain. pt reports about 3 days ago he noticed his back was bothering him, after picking up his grandkids. pt reports today he was walking around in the kitchen and his back pain began to worse and radiate down his legs. pt reports his legs began to wobble and feel weak as if they were giving out so he lowered himself to the ground onto his stomach. pt reports he could not get himself off the floor and was on the ground for 7 hours. pt reports his fiance came to his house because he wasn't answering the phone and found him on the floor but was unable to get him up on her own so she called EMS. pt reports he follows ortho Dr. Hooper for herniated discs and has had epidurals in the past, last one was about a year ago. pt reports he has been seen by a neurosurgeon who offered surgery which he did not get. pt is followed by pain management dr. andrews and is prescribed tizanidine which he took earlier today without relief. pt denies illicit drug use, fever, rashes, urinary or bowel retention or incontinence, abdominal pain, n/v/d/c, recent trauma, heavy lifting, exericising, joint pain, chest pain, or sob.

## 2022-08-21 NOTE — ED PROVIDER NOTE - ATTENDING APP SHARED VISIT CONTRIBUTION OF CARE
I have personally seen and examined this patient. I have fully participated in the care of this patient. I have made amendments to the documentation where appropriate and otherwise agree with the history, physical exam, and plan as documented by the PA    see MDM

## 2022-08-22 LAB
ALBUMIN SERPL ELPH-MCNC: 4.3 G/DL — SIGNIFICANT CHANGE UP (ref 3.5–5.2)
ALP SERPL-CCNC: 57 U/L — SIGNIFICANT CHANGE UP (ref 30–115)
ALT FLD-CCNC: 25 U/L — SIGNIFICANT CHANGE UP (ref 0–41)
ANION GAP SERPL CALC-SCNC: 10 MMOL/L — SIGNIFICANT CHANGE UP (ref 7–14)
AST SERPL-CCNC: 23 U/L — SIGNIFICANT CHANGE UP (ref 0–41)
BILIRUB SERPL-MCNC: 1 MG/DL — SIGNIFICANT CHANGE UP (ref 0.2–1.2)
BUN SERPL-MCNC: 21 MG/DL — HIGH (ref 10–20)
CALCIUM SERPL-MCNC: 8.9 MG/DL — SIGNIFICANT CHANGE UP (ref 8.5–10.1)
CHLORIDE SERPL-SCNC: 108 MMOL/L — SIGNIFICANT CHANGE UP (ref 98–110)
CK SERPL-CCNC: 98 U/L — SIGNIFICANT CHANGE UP (ref 0–225)
CO2 SERPL-SCNC: 27 MMOL/L — SIGNIFICANT CHANGE UP (ref 17–32)
CREAT SERPL-MCNC: 1 MG/DL — SIGNIFICANT CHANGE UP (ref 0.7–1.5)
EGFR: 90 ML/MIN/1.73M2 — SIGNIFICANT CHANGE UP
GLUCOSE BLDC GLUCOMTR-MCNC: 107 MG/DL — HIGH (ref 70–99)
GLUCOSE BLDC GLUCOMTR-MCNC: 112 MG/DL — HIGH (ref 70–99)
GLUCOSE BLDC GLUCOMTR-MCNC: 114 MG/DL — HIGH (ref 70–99)
GLUCOSE BLDC GLUCOMTR-MCNC: 123 MG/DL — HIGH (ref 70–99)
GLUCOSE SERPL-MCNC: 118 MG/DL — HIGH (ref 70–99)
HCT VFR BLD CALC: 46.2 % — SIGNIFICANT CHANGE UP (ref 42–52)
HGB BLD-MCNC: 15.8 G/DL — SIGNIFICANT CHANGE UP (ref 14–18)
MCHC RBC-ENTMCNC: 30.2 PG — SIGNIFICANT CHANGE UP (ref 27–31)
MCHC RBC-ENTMCNC: 34.2 G/DL — SIGNIFICANT CHANGE UP (ref 32–37)
MCV RBC AUTO: 88.3 FL — SIGNIFICANT CHANGE UP (ref 80–94)
NRBC # BLD: 0 /100 WBCS — SIGNIFICANT CHANGE UP (ref 0–0)
PLATELET # BLD AUTO: 221 K/UL — SIGNIFICANT CHANGE UP (ref 130–400)
POTASSIUM SERPL-MCNC: 4 MMOL/L — SIGNIFICANT CHANGE UP (ref 3.5–5)
POTASSIUM SERPL-SCNC: 4 MMOL/L — SIGNIFICANT CHANGE UP (ref 3.5–5)
PROT SERPL-MCNC: 6.3 G/DL — SIGNIFICANT CHANGE UP (ref 6–8)
RBC # BLD: 5.23 M/UL — SIGNIFICANT CHANGE UP (ref 4.7–6.1)
RBC # FLD: 13 % — SIGNIFICANT CHANGE UP (ref 11.5–14.5)
SARS-COV-2 RNA SPEC QL NAA+PROBE: SIGNIFICANT CHANGE UP
SODIUM SERPL-SCNC: 145 MMOL/L — SIGNIFICANT CHANGE UP (ref 135–146)
WBC # BLD: 7.12 K/UL — SIGNIFICANT CHANGE UP (ref 4.8–10.8)
WBC # FLD AUTO: 7.12 K/UL — SIGNIFICANT CHANGE UP (ref 4.8–10.8)

## 2022-08-22 PROCEDURE — 72100 X-RAY EXAM L-S SPINE 2/3 VWS: CPT | Mod: 26

## 2022-08-22 PROCEDURE — 99222 1ST HOSP IP/OBS MODERATE 55: CPT

## 2022-08-22 RX ORDER — DEXTROSE 50 % IN WATER 50 %
25 SYRINGE (ML) INTRAVENOUS ONCE
Refills: 0 | Status: DISCONTINUED | OUTPATIENT
Start: 2022-08-22 | End: 2022-08-24

## 2022-08-22 RX ORDER — TIZANIDINE 4 MG/1
2 TABLET ORAL
Qty: 0 | Refills: 0 | DISCHARGE

## 2022-08-22 RX ORDER — DEXTROSE 50 % IN WATER 50 %
12.5 SYRINGE (ML) INTRAVENOUS ONCE
Refills: 0 | Status: DISCONTINUED | OUTPATIENT
Start: 2022-08-22 | End: 2022-08-24

## 2022-08-22 RX ORDER — ALOGLIPTIN BENZOATE AND PIOGLITAZONE HYDROCHLORIDE 12.5; 15 MG/1; MG/1
1 TABLET, FILM COATED ORAL
Qty: 0 | Refills: 0 | DISCHARGE

## 2022-08-22 RX ORDER — LOSARTAN POTASSIUM 100 MG/1
1 TABLET, FILM COATED ORAL
Qty: 0 | Refills: 0 | DISCHARGE

## 2022-08-22 RX ORDER — GLUCAGON INJECTION, SOLUTION 0.5 MG/.1ML
1 INJECTION, SOLUTION SUBCUTANEOUS ONCE
Refills: 0 | Status: DISCONTINUED | OUTPATIENT
Start: 2022-08-22 | End: 2022-08-24

## 2022-08-22 RX ORDER — ISOSORBIDE MONONITRATE 60 MG/1
1 TABLET, EXTENDED RELEASE ORAL
Qty: 0 | Refills: 0 | DISCHARGE

## 2022-08-22 RX ORDER — PANTOPRAZOLE SODIUM 20 MG/1
1 TABLET, DELAYED RELEASE ORAL
Qty: 0 | Refills: 0 | DISCHARGE

## 2022-08-22 RX ORDER — INSULIN GLARGINE 100 [IU]/ML
12 INJECTION, SOLUTION SUBCUTANEOUS AT BEDTIME
Refills: 0 | Status: DISCONTINUED | OUTPATIENT
Start: 2022-08-22 | End: 2022-08-23

## 2022-08-22 RX ORDER — DILTIAZEM HCL 120 MG
180 CAPSULE, EXT RELEASE 24 HR ORAL DAILY
Refills: 0 | Status: DISCONTINUED | OUTPATIENT
Start: 2022-08-22 | End: 2022-08-24

## 2022-08-22 RX ORDER — ASPIRIN/CALCIUM CARB/MAGNESIUM 324 MG
81 TABLET ORAL DAILY
Refills: 0 | Status: DISCONTINUED | OUTPATIENT
Start: 2022-08-22 | End: 2022-08-24

## 2022-08-22 RX ORDER — CYCLOBENZAPRINE HYDROCHLORIDE 10 MG/1
10 TABLET, FILM COATED ORAL THREE TIMES A DAY
Refills: 0 | Status: DISCONTINUED | OUTPATIENT
Start: 2022-08-22 | End: 2022-08-24

## 2022-08-22 RX ORDER — ACETAMINOPHEN 500 MG
650 TABLET ORAL EVERY 6 HOURS
Refills: 0 | Status: DISCONTINUED | OUTPATIENT
Start: 2022-08-22 | End: 2022-08-24

## 2022-08-22 RX ORDER — ENOXAPARIN SODIUM 100 MG/ML
40 INJECTION SUBCUTANEOUS EVERY 24 HOURS
Refills: 0 | Status: DISCONTINUED | OUTPATIENT
Start: 2022-08-22 | End: 2022-08-24

## 2022-08-22 RX ORDER — INSULIN LISPRO 100/ML
3 VIAL (ML) SUBCUTANEOUS
Refills: 0 | Status: DISCONTINUED | OUTPATIENT
Start: 2022-08-22 | End: 2022-08-24

## 2022-08-22 RX ORDER — ATORVASTATIN CALCIUM 80 MG/1
1 TABLET, FILM COATED ORAL
Qty: 0 | Refills: 0 | DISCHARGE

## 2022-08-22 RX ORDER — SODIUM CHLORIDE 9 MG/ML
1000 INJECTION, SOLUTION INTRAVENOUS
Refills: 0 | Status: DISCONTINUED | OUTPATIENT
Start: 2022-08-22 | End: 2022-08-24

## 2022-08-22 RX ORDER — DULAGLUTIDE 4.5 MG/.5ML
0 INJECTION, SOLUTION SUBCUTANEOUS
Qty: 0 | Refills: 0 | DISCHARGE

## 2022-08-22 RX ORDER — LOSARTAN POTASSIUM 100 MG/1
25 TABLET, FILM COATED ORAL DAILY
Refills: 0 | Status: DISCONTINUED | OUTPATIENT
Start: 2022-08-22 | End: 2022-08-24

## 2022-08-22 RX ORDER — RANOLAZINE 500 MG/1
1 TABLET, FILM COATED, EXTENDED RELEASE ORAL
Qty: 0 | Refills: 0 | DISCHARGE

## 2022-08-22 RX ORDER — DILTIAZEM HCL 120 MG
1 CAPSULE, EXT RELEASE 24 HR ORAL
Qty: 0 | Refills: 0 | DISCHARGE

## 2022-08-22 RX ORDER — RANOLAZINE 500 MG/1
500 TABLET, FILM COATED, EXTENDED RELEASE ORAL
Refills: 0 | Status: DISCONTINUED | OUTPATIENT
Start: 2022-08-22 | End: 2022-08-24

## 2022-08-22 RX ORDER — INSULIN LISPRO 100/ML
VIAL (ML) SUBCUTANEOUS
Refills: 0 | Status: DISCONTINUED | OUTPATIENT
Start: 2022-08-22 | End: 2022-08-24

## 2022-08-22 RX ORDER — PANTOPRAZOLE SODIUM 20 MG/1
40 TABLET, DELAYED RELEASE ORAL
Refills: 0 | Status: DISCONTINUED | OUTPATIENT
Start: 2022-08-22 | End: 2022-08-24

## 2022-08-22 RX ORDER — DEXTROSE 50 % IN WATER 50 %
15 SYRINGE (ML) INTRAVENOUS ONCE
Refills: 0 | Status: DISCONTINUED | OUTPATIENT
Start: 2022-08-22 | End: 2022-08-24

## 2022-08-22 RX ORDER — ASPIRIN/CALCIUM CARB/MAGNESIUM 324 MG
1 TABLET ORAL
Qty: 0 | Refills: 0 | DISCHARGE

## 2022-08-22 RX ORDER — LANOLIN ALCOHOL/MO/W.PET/CERES
3 CREAM (GRAM) TOPICAL AT BEDTIME
Refills: 0 | Status: DISCONTINUED | OUTPATIENT
Start: 2022-08-22 | End: 2022-08-24

## 2022-08-22 RX ORDER — ATORVASTATIN CALCIUM 80 MG/1
40 TABLET, FILM COATED ORAL AT BEDTIME
Refills: 0 | Status: DISCONTINUED | OUTPATIENT
Start: 2022-08-22 | End: 2022-08-24

## 2022-08-22 RX ORDER — PIOGLITAZONE HYDROCHLORIDE 15 MG/1
1 TABLET ORAL
Qty: 0 | Refills: 0 | DISCHARGE

## 2022-08-22 RX ORDER — EMPAGLIFLOZIN 10 MG/1
1 TABLET, FILM COATED ORAL
Qty: 0 | Refills: 0 | DISCHARGE

## 2022-08-22 RX ORDER — CELECOXIB 200 MG/1
200 CAPSULE ORAL
Refills: 0 | Status: DISCONTINUED | OUTPATIENT
Start: 2022-08-22 | End: 2022-08-24

## 2022-08-22 RX ADMIN — Medication 3 UNIT(S): at 08:24

## 2022-08-22 RX ADMIN — Medication 3 MILLIGRAM(S): at 22:05

## 2022-08-22 RX ADMIN — CELECOXIB 200 MILLIGRAM(S): 200 CAPSULE ORAL at 14:29

## 2022-08-22 RX ADMIN — INSULIN GLARGINE 12 UNIT(S): 100 INJECTION, SOLUTION SUBCUTANEOUS at 22:41

## 2022-08-22 RX ADMIN — RANOLAZINE 500 MILLIGRAM(S): 500 TABLET, FILM COATED, EXTENDED RELEASE ORAL at 17:46

## 2022-08-22 RX ADMIN — Medication 3 UNIT(S): at 17:47

## 2022-08-22 RX ADMIN — CELECOXIB 200 MILLIGRAM(S): 200 CAPSULE ORAL at 15:29

## 2022-08-22 RX ADMIN — Medication 81 MILLIGRAM(S): at 11:53

## 2022-08-22 RX ADMIN — CELECOXIB 200 MILLIGRAM(S): 200 CAPSULE ORAL at 17:46

## 2022-08-22 RX ADMIN — Medication 180 MILLIGRAM(S): at 06:19

## 2022-08-22 RX ADMIN — LOSARTAN POTASSIUM 25 MILLIGRAM(S): 100 TABLET, FILM COATED ORAL at 06:19

## 2022-08-22 RX ADMIN — PANTOPRAZOLE SODIUM 40 MILLIGRAM(S): 20 TABLET, DELAYED RELEASE ORAL at 08:25

## 2022-08-22 RX ADMIN — RANOLAZINE 500 MILLIGRAM(S): 500 TABLET, FILM COATED, EXTENDED RELEASE ORAL at 06:18

## 2022-08-22 RX ADMIN — ATORVASTATIN CALCIUM 40 MILLIGRAM(S): 80 TABLET, FILM COATED ORAL at 21:13

## 2022-08-22 RX ADMIN — ENOXAPARIN SODIUM 40 MILLIGRAM(S): 100 INJECTION SUBCUTANEOUS at 11:52

## 2022-08-22 RX ADMIN — CELECOXIB 200 MILLIGRAM(S): 200 CAPSULE ORAL at 18:46

## 2022-08-22 RX ADMIN — Medication 650 MILLIGRAM(S): at 06:18

## 2022-08-22 RX ADMIN — CYCLOBENZAPRINE HYDROCHLORIDE 10 MILLIGRAM(S): 10 TABLET, FILM COATED ORAL at 23:39

## 2022-08-22 NOTE — H&P ADULT - HISTORY OF PRESENT ILLNESS
51 year old man with PMHx of HTN, HLD, DMII, hiatal hernia, herniated discs, CAD on medical tx , chronic low back pain is presenting with 1 day history of severe low back pain and weakness in both of his LE. Patient reports that he lifted his grandkids today and felt that the back pain radiated to his LE and they became weak and ultimately he couldn't bare his weight. Patient denies any fever or chills. No weight loss or change in his apetite. No urinary incontinence or retention. No stool incontinence and no numbness in his pelvic area. He reports tingling and numbness in his LE that has now resolved.

## 2022-08-22 NOTE — H&P ADULT - ASSESSMENT
51 year old man with PMHx of HTN, HLD, DMII, hiatal hernia, herniated discs, CAD on medical tx , chronic low back pain is presenting with 1 day history of severe low back pain and weakness in both of his LE.    #Radicular Pain  - Patient with low back and neurologic sequale, r/o nerve impingement  - Consider MRI of lumbosacral spine  - C/S neurosurgery  - Pain killers,  - PT c/s    #HTN:  - On losartan, diltiazem    #DMII  - Lantus/Lispro  - SS  - f/u A1c    #CAD  - on Aspirin    Activity: as tolerated, PT  Diet: DASH/carbs cons  DVT prophylaxis: lovenox 40 mg  GI prophylaxis: pantoprazole  Dispo: floor

## 2022-08-22 NOTE — PHYSICAL THERAPY INITIAL EVALUATION ADULT - SPECIFY REASON(S)
Chart reviewed, d/w GAVIN Day and Dr. Otero during throughput. Lumbar XR completed. now pending thoracic and lumbar urgent MRI and NS consult. PT on hold at this time.

## 2022-08-22 NOTE — PROGRESS NOTE ADULT - SUBJECTIVE AND OBJECTIVE BOX
BELIA CH 51y Male  MRN#: [id number]   CODE STATUS:________    Hospital Day:     Pt is currently admitted with the primary diagnosis of     Overnight events   -No major overnight events                                          ----------------------------------------------------------  OBJECTIVE  PAST MEDICAL & SURGICAL HISTORY  Essential hypertension    Diabetes    Hypercholesteremia    CAD (coronary artery disease)    H/O percutaneous left heart catheterization                                              -----------------------------------------------------------  ALLERGIES:  Allergy Status Unknown                                            ------------------------------------------------------------    HOME MEDICATIONS  Home Medications:  Aspirin Low Dose 81 mg oral delayed release tablet: 1 tab(s) orally once a day (22 Aug 2022 05:27)  atorvastatin 40 mg oral tablet: 1 tab(s) orally once a day (22 Aug 2022 05:)  Cartia  mg/24 hours oral capsule, extended release: 1 cap(s) orally once a day (22 Aug 2022 05:27)  empagliflozin 25 mg oral tablet: 1 tab(s) orally once a day (in the morning) (22 Aug 2022 05:27)  losartan 25 mg oral tablet: 1 tab(s) orally once a day (22 Aug 2022 05:27)  pantoprazole 40 mg oral delayed release tablet: 1 tab(s) orally once a day (22 Aug 2022 05:27)  pioglitazone 45 mg oral tablet: 1 tab(s) orally once a day (22 Aug 2022 05:27)  ranolazine 500 mg oral granule, extended release: 1 tab(s) orally once a day (22 Aug 2022 05:27)  tiZANidine 4 mg oral tablet: 2 tab(s) orally every 8 hours, As Needed (22 Aug 2022 05:27)  Tylenol 325 mg oral tablet: 2 tab(s) orally every 4 hours (22 Aug 2022 05:27)                           MEDICATIONS:  STANDING MEDICATIONS  aspirin enteric coated 81 milliGRAM(s) Oral daily  atorvastatin 40 milliGRAM(s) Oral at bedtime  dextrose 5%. 1000 milliLiter(s) IV Continuous <Continuous>  dextrose 5%. 1000 milliLiter(s) IV Continuous <Continuous>  dextrose 50% Injectable 25 Gram(s) IV Push once  dextrose 50% Injectable 12.5 Gram(s) IV Push once  dextrose 50% Injectable 25 Gram(s) IV Push once  diltiazem    milliGRAM(s) Oral daily  enoxaparin Injectable 40 milliGRAM(s) SubCutaneous every 24 hours  glucagon  Injectable 1 milliGRAM(s) IntraMuscular once  insulin glargine Injectable (LANTUS) 12 Unit(s) SubCutaneous at bedtime  insulin lispro (ADMELOG) corrective regimen sliding scale   SubCutaneous three times a day before meals  insulin lispro Injectable (ADMELOG) 3 Unit(s) SubCutaneous three times a day before meals  losartan 25 milliGRAM(s) Oral daily  pantoprazole    Tablet 40 milliGRAM(s) Oral before breakfast  ranolazine 500 milliGRAM(s) Oral two times a day    PRN MEDICATIONS  acetaminophen     Tablet .. 650 milliGRAM(s) Oral every 6 hours PRN  dextrose Oral Gel 15 Gram(s) Oral once PRN  melatonin 3 milliGRAM(s) Oral at bedtime PRN                                            ------------------------------------------------------------  VITAL SIGNS: Last 24 Hours  T(C): 36 (22 Aug 2022 03:00), Max: 37.2 (21 Aug 2022 20:35)  T(F): 96.8 (22 Aug 2022 03:00), Max: 98.9 (21 Aug 2022 20:35)  HR: 68 (22 Aug 2022 06:22) (58 - 72)  BP: 110/72 (22 Aug 2022 06:22) (110/72 - 125/77)  BP(mean): --  RR: 18 (22 Aug 2022 03:00) (16 - 18)  SpO2: 97% (21 Aug 2022 23:50) (97% - 99%)                                             --------------------------------------------------------------  LABS:                        16.1   9.33  )-----------( 227      ( 21 Aug 2022 21:42 )             47.2         143  |  105  |  14  ----------------------------<  113<H>  4.2   |  25  |  0.9    Ca    9.3      21 Aug 2022 21:42    TPro  6.7  /  Alb  4.3  /  TBili  0.9  /  DBili  x   /  AST  22  /  ALT  23  /  AlkPhos  53        Urinalysis Basic - ( 21 Aug 2022 23:43 )    Color: Yellow / Appearance: Clear / S.043 / pH: x  Gluc: x / Ketone: Small  / Bili: Negative / Urobili: <2 mg/dL   Blood: x / Protein: Trace / Nitrite: Negative   Leuk Esterase: Negative / RBC: x / WBC x   Sq Epi: x / Non Sq Epi: x / Bacteria: x        Creatine Kinase, Serum: 90 U/L (22 @ 21:42)          CARDIAC MARKERS ( 21 Aug 2022 21:42 )  x     / x     / 90 U/L / x     / x                                                    --------------------------------------------------------------    PHYSICAL EXAM:  GENERAL: Awake, alert, oriented to person, place, time, situation. Well-nourished, laying in bed appearing in no acute distress  HEENT: No FNDs, atraumatic, normocephalic  LUNGS: Clear to auscultation bilaterally  HEART: S1/S2. No heaves or thrills  ABD: Soft, non-tender, non-distended.  EXT/NEURO: 5/5 strength in all extremity joints. Sensation and ROM grossly intact.  SKIN: No edema      PLAN:  51 year old man with PMHx of HTN, HLD, DMII, hiatal hernia, herniated discs, CAD on medical tx , chronic low back pain is presenting with 1 day history of severe low back pain and weakness in both of his LE.    #Radicular Pain  - Patient with low back and neurologic sequale, r/o nerve impingement  - Consider MRI of lumbosacral spine  - C/S neurosurgery  - Pain killers,  - PT c/s    #HTN:  - On losartan, diltiazem    #DMII  - Lantus/Lispro  - SS  - f/u A1c    #CAD  - on Aspirin    Activity: as tolerated, PT  Diet: DASH/carbs cons  DVT prophylaxis: lovenox 40 mg  GI prophylaxis: pantoprazole  Dispo: floor    #Progress Note Handoff  Pending (specify):  Family discussion:   Disposition:        BELIA CH 51y Male  MRN#: [id number]   CODE STATUS:________    Hospital Day:     Pt is currently admitted with the primary diagnosis of radiculopathy    Overnight events   -No major overnight events                                          ----------------------------------------------------------  OBJECTIVE  PAST MEDICAL & SURGICAL HISTORY  Essential hypertension    Diabetes    Hypercholesteremia    CAD (coronary artery disease)    H/O percutaneous left heart catheterization                                              -----------------------------------------------------------  ALLERGIES:  Allergy Status Unknown                                            ------------------------------------------------------------    HOME MEDICATIONS  Home Medications:  Aspirin Low Dose 81 mg oral delayed release tablet: 1 tab(s) orally once a day (22 Aug 2022 05:27)  atorvastatin 40 mg oral tablet: 1 tab(s) orally once a day (22 Aug 2022 05:27)  Cartia  mg/24 hours oral capsule, extended release: 1 cap(s) orally once a day (22 Aug 2022 05:27)  empagliflozin 25 mg oral tablet: 1 tab(s) orally once a day (in the morning) (22 Aug 2022 05:27)  losartan 25 mg oral tablet: 1 tab(s) orally once a day (22 Aug 2022 05:27)  pantoprazole 40 mg oral delayed release tablet: 1 tab(s) orally once a day (22 Aug 2022 05:27)  pioglitazone 45 mg oral tablet: 1 tab(s) orally once a day (22 Aug 2022 05:27)  ranolazine 500 mg oral granule, extended release: 1 tab(s) orally once a day (22 Aug 2022 05:27)  tiZANidine 4 mg oral tablet: 2 tab(s) orally every 8 hours, As Needed (22 Aug 2022 05:27)  Tylenol 325 mg oral tablet: 2 tab(s) orally every 4 hours (22 Aug 2022 05:27)                           MEDICATIONS:  STANDING MEDICATIONS  aspirin enteric coated 81 milliGRAM(s) Oral daily  atorvastatin 40 milliGRAM(s) Oral at bedtime  dextrose 5%. 1000 milliLiter(s) IV Continuous <Continuous>  dextrose 5%. 1000 milliLiter(s) IV Continuous <Continuous>  dextrose 50% Injectable 25 Gram(s) IV Push once  dextrose 50% Injectable 12.5 Gram(s) IV Push once  dextrose 50% Injectable 25 Gram(s) IV Push once  diltiazem    milliGRAM(s) Oral daily  enoxaparin Injectable 40 milliGRAM(s) SubCutaneous every 24 hours  glucagon  Injectable 1 milliGRAM(s) IntraMuscular once  insulin glargine Injectable (LANTUS) 12 Unit(s) SubCutaneous at bedtime  insulin lispro (ADMELOG) corrective regimen sliding scale   SubCutaneous three times a day before meals  insulin lispro Injectable (ADMELOG) 3 Unit(s) SubCutaneous three times a day before meals  losartan 25 milliGRAM(s) Oral daily  pantoprazole    Tablet 40 milliGRAM(s) Oral before breakfast  ranolazine 500 milliGRAM(s) Oral two times a day    PRN MEDICATIONS  acetaminophen     Tablet .. 650 milliGRAM(s) Oral every 6 hours PRN  dextrose Oral Gel 15 Gram(s) Oral once PRN  melatonin 3 milliGRAM(s) Oral at bedtime PRN                                            ------------------------------------------------------------  VITAL SIGNS: Last 24 Hours  T(C): 36 (22 Aug 2022 03:00), Max: 37.2 (21 Aug 2022 20:35)  T(F): 96.8 (22 Aug 2022 03:00), Max: 98.9 (21 Aug 2022 20:35)  HR: 68 (22 Aug 2022 06:22) (58 - 72)  BP: 110/72 (22 Aug 2022 06:22) (110/72 - 125/77)  BP(mean): --  RR: 18 (22 Aug 2022 03:00) (16 - 18)  SpO2: 97% (21 Aug 2022 23:50) (97% - 99%)                                             --------------------------------------------------------------  LABS:                        16.1   9.33  )-----------( 227      ( 21 Aug 2022 21:42 )             47.2         143  |  105  |  14  ----------------------------<  113<H>  4.2   |  25  |  0.9    Ca    9.3      21 Aug 2022 21:42    TPro  6.7  /  Alb  4.3  /  TBili  0.9  /  DBili  x   /  AST  22  /  ALT  23  /  AlkPhos  53        Urinalysis Basic - ( 21 Aug 2022 23:43 )    Color: Yellow / Appearance: Clear / S.043 / pH: x  Gluc: x / Ketone: Small  / Bili: Negative / Urobili: <2 mg/dL   Blood: x / Protein: Trace / Nitrite: Negative   Leuk Esterase: Negative / RBC: x / WBC x   Sq Epi: x / Non Sq Epi: x / Bacteria: x        Creatine Kinase, Serum: 90 U/L (22 @ 21:42)          CARDIAC MARKERS ( 21 Aug 2022 21:42 )  x     / x     / 90 U/L / x     / x                                                    --------------------------------------------------------------    PHYSICAL EXAM:  GENERAL: Awake, alert, oriented to person, place, time, situation. Well-nourished, laying in bed appearing in no acute distress  HEENT: No FNDs, atraumatic, normocephalic  LUNGS: Clear to auscultation bilaterally  HEART: S1/S2. No heaves or thrills  ABD: Soft, non-tender, non-distended.  EXT/NEURO: No vertebral tenderness. 5/5 strength in all extremity joints. Sensation and ROM grossly intact.  SKIN: No edema      PLAN:  51 year old man with PMHx of HTN, HLD, DMII, hiatal hernia, herniated discs, CAD on medical tx , chronic low back pain is presenting with 1 day history of severe low back pain and weakness in both of his LE.    #Radicular Pain  - Patient with low back and neurologic sequale, r/o nerve impingement  - Pain killers  - f/u PT  -f/u NSG  -f/u Xray lumbar spine    #HTN:  - On losartan, diltiazem    #DMII  - Lantus/Lispro  - SS  - f/u A1c    #CAD  - on Aspirin    Activity: as tolerated, PT  Diet: DASH/carbs cons  DVT prophylaxis: lovenox 40 mg  GI prophylaxis: pantoprazole  Dispo: floor    #Progress Note Handoff  Pending (specify): XR lumbar spine, PT, NSG  Family discussion:   Disposition:        BELIA CH 51y Male  MRN#: [id number]   CODE STATUS:________    Hospital Day:     Pt is currently admitted with the primary diagnosis of radiculopathy    Overnight events   -No major overnight events                                          ----------------------------------------------------------  OBJECTIVE  PAST MEDICAL & SURGICAL HISTORY  Essential hypertension    Diabetes    Hypercholesteremia    CAD (coronary artery disease)    H/O percutaneous left heart catheterization                                              -----------------------------------------------------------  ALLERGIES:  Allergy Status Unknown                                            ------------------------------------------------------------    HOME MEDICATIONS  Home Medications:  Aspirin Low Dose 81 mg oral delayed release tablet: 1 tab(s) orally once a day (22 Aug 2022 05:27)  atorvastatin 40 mg oral tablet: 1 tab(s) orally once a day (22 Aug 2022 05:27)  Cartia  mg/24 hours oral capsule, extended release: 1 cap(s) orally once a day (22 Aug 2022 05:27)  empagliflozin 25 mg oral tablet: 1 tab(s) orally once a day (in the morning) (22 Aug 2022 05:27)  losartan 25 mg oral tablet: 1 tab(s) orally once a day (22 Aug 2022 05:27)  pantoprazole 40 mg oral delayed release tablet: 1 tab(s) orally once a day (22 Aug 2022 05:27)  pioglitazone 45 mg oral tablet: 1 tab(s) orally once a day (22 Aug 2022 05:27)  ranolazine 500 mg oral granule, extended release: 1 tab(s) orally once a day (22 Aug 2022 05:27)  tiZANidine 4 mg oral tablet: 2 tab(s) orally every 8 hours, As Needed (22 Aug 2022 05:27)  Tylenol 325 mg oral tablet: 2 tab(s) orally every 4 hours (22 Aug 2022 05:27)                           MEDICATIONS:  STANDING MEDICATIONS  aspirin enteric coated 81 milliGRAM(s) Oral daily  atorvastatin 40 milliGRAM(s) Oral at bedtime  dextrose 5%. 1000 milliLiter(s) IV Continuous <Continuous>  dextrose 5%. 1000 milliLiter(s) IV Continuous <Continuous>  dextrose 50% Injectable 25 Gram(s) IV Push once  dextrose 50% Injectable 12.5 Gram(s) IV Push once  dextrose 50% Injectable 25 Gram(s) IV Push once  diltiazem    milliGRAM(s) Oral daily  enoxaparin Injectable 40 milliGRAM(s) SubCutaneous every 24 hours  glucagon  Injectable 1 milliGRAM(s) IntraMuscular once  insulin glargine Injectable (LANTUS) 12 Unit(s) SubCutaneous at bedtime  insulin lispro (ADMELOG) corrective regimen sliding scale   SubCutaneous three times a day before meals  insulin lispro Injectable (ADMELOG) 3 Unit(s) SubCutaneous three times a day before meals  losartan 25 milliGRAM(s) Oral daily  pantoprazole    Tablet 40 milliGRAM(s) Oral before breakfast  ranolazine 500 milliGRAM(s) Oral two times a day    PRN MEDICATIONS  acetaminophen     Tablet .. 650 milliGRAM(s) Oral every 6 hours PRN  dextrose Oral Gel 15 Gram(s) Oral once PRN  melatonin 3 milliGRAM(s) Oral at bedtime PRN                                            ------------------------------------------------------------  VITAL SIGNS: Last 24 Hours  T(C): 36 (22 Aug 2022 03:00), Max: 37.2 (21 Aug 2022 20:35)  T(F): 96.8 (22 Aug 2022 03:00), Max: 98.9 (21 Aug 2022 20:35)  HR: 68 (22 Aug 2022 06:22) (58 - 72)  BP: 110/72 (22 Aug 2022 06:22) (110/72 - 125/77)  BP(mean): --  RR: 18 (22 Aug 2022 03:00) (16 - 18)  SpO2: 97% (21 Aug 2022 23:50) (97% - 99%)                                             --------------------------------------------------------------  LABS:                        16.1   9.33  )-----------( 227      ( 21 Aug 2022 21:42 )             47.2         143  |  105  |  14  ----------------------------<  113<H>  4.2   |  25  |  0.9    Ca    9.3      21 Aug 2022 21:42    TPro  6.7  /  Alb  4.3  /  TBili  0.9  /  DBili  x   /  AST  22  /  ALT  23  /  AlkPhos  53        Urinalysis Basic - ( 21 Aug 2022 23:43 )    Color: Yellow / Appearance: Clear / S.043 / pH: x  Gluc: x / Ketone: Small  / Bili: Negative / Urobili: <2 mg/dL   Blood: x / Protein: Trace / Nitrite: Negative   Leuk Esterase: Negative / RBC: x / WBC x   Sq Epi: x / Non Sq Epi: x / Bacteria: x        Creatine Kinase, Serum: 90 U/L (22 @ 21:42)          CARDIAC MARKERS ( 21 Aug 2022 21:42 )  x     / x     / 90 U/L / x     / x                                                    --------------------------------------------------------------    PHYSICAL EXAM:  GENERAL: Awake, alert, oriented to person, place, time, situation. Well-nourished, laying in bed appearing in no acute distress  HEENT: No FNDs, atraumatic, normocephalic  LUNGS: Clear to auscultation bilaterally  HEART: S1/S2. No heaves or thrills  ABD: Soft, non-tender, non-distended.  EXT/NEURO: No vertebral tenderness. 5/5 strength in all extremity joints. Sensation and ROM grossly intact.  SKIN: No edema      PLAN:  51 year old man with PMHx of HTN, HLD, DMII, hiatal hernia, herniated discs, CAD on medical tx , chronic low back pain is presenting with 1 day history of severe low back pain and weakness in both of his LE.    #Radicular Pain  -pt states progressive lumbago + leg pain/numbness since  with fall on ground for hours   - Patient with low back and neurologic sequale, r/o nerve impingement  - Pain killers  - f/u PT  -f/u NSG  -f/u Xray lumbar spine    #HTN:  - On losartan, diltiazem    #DMII  - Lantus/Lispro  - SS  - f/u A1c    #CAD  - on Aspirin    Activity: as tolerated, PT  Diet: DASH/carbs cons  DVT prophylaxis: lovenox 40 mg  GI prophylaxis: pantoprazole  Dispo: floor    #Progress Note Handoff  Pending (specify): XR lumbar spine, PT, NSG  Family discussion:   Disposition:

## 2022-08-22 NOTE — H&P ADULT - NSICDXPASTMEDICALHX_GEN_ALL_CORE_FT
PAST MEDICAL HISTORY:  CAD (coronary artery disease)     Diabetes     Essential hypertension     Hypercholesteremia

## 2022-08-22 NOTE — H&P ADULT - ATTENDING COMMENTS
51 year old man with PMHx of HTN, HLD, DMII, hiatal hernia, herniated discs, CAD on medical tx , chronic low back pain is presented with progressive pain and LE weakness after lifting his grandson on 8/20.  On 8/21 he fell in his house and reports being on the floor for 6-7 hours before his girlfriend found him and called 911.     Vital Signs Last 24 Hrs  T(F): 96.8 (22 Aug 2022 16:23), Max: 98.9 (21 Aug 2022 20:35)  HR: 68 (22 Aug 2022 16:23) (58 - 72)  BP: 110/59 (22 Aug 2022 16:23) (109/67 - 125/77)  RR: 18 (22 Aug 2022 16:23) (16 - 18)  SpO2: 97% (21 Aug 2022 23:50) (97% - 99%)    PHYSICAL EXAM:  GENERAL: NAD, well-groomed, well-developed  HEAD:  Atraumatic, Normocephalic  EYES: EOMI, conjunctiva and sclera clear  ENMT: Moist mucous membranes, Good dentition, no thrush  NECK: Supple, No JVD  CHEST/LUNG: Clear to auscultation bilaterally, good air entry, non-labored breathing  HEART: RRR; S1/S2, No murmur  ABDOMEN: Soft, Nontender, Nondistended; Bowel sounds present  VASCULAR: Normal pulses, Normal capillary refill  EXTREMITIES: No calf tenderness, No cyanosis, No edema; LE muscle strength 4/5  LYMPH: Normal; No lymphadenopathy noted  SKIN: Warm, Intact  PSYCH: Normal mood, Normal affect  NERVOUS SYSTEM:  A/O x3, Good concentration; CN 2-12 intact,     #Radicular Pain  #Fall  - Patient with low back and neurologic sequale, r/o nerve impingement  - Lumbar xray no significant findings  - MRI throacic, lumbar, sacral ordered   - C/S neurosurgery  - Pain killers,  - PT c/s    #HTN:  - On losartan, diltiazem    #DMII  - Lantus/Lispro  - SS  - f/u A1c    #CAD  - on Aspirin    Activity: as tolerated, PT  Diet: DASH/carbs cons  DVT prophylaxis: lovenox 40 mg  GI prophylaxis: pantoprazole  Dispo: floor  Pending: pain control, neurosurgery consult, MRI

## 2022-08-22 NOTE — H&P ADULT - NSHPLABSRESULTS_GEN_ALL_CORE
Complete Blood Count + Automated Diff (08.21.22 @ 21:42)   WBC Count: 9.33 K/uL   RBC Count: 5.31 M/uL   Hemoglobin: 16.1 g/dL   Hematocrit: 47.2 %   Mean Cell Volume: 88.9 fL   Mean Cell Hemoglobin: 30.3 pg   Mean Cell Hemoglobin Conc: 34.1 g/dL   Red Cell Distrib Width: 13.1 %   Platelet Count - Automated: 227 K/uL   Auto Neutrophil #: 6.41 K/uL   Auto Lymphocyte #: 2.02 K/uL   Auto Monocyte #: 0.71 K/uL   Auto Eosinophil #: 0.10 K/uL   Auto Basophil #: 0.05 K/uL   Auto Neutrophil %: 68.7: Differential percentages must be correlated with absolute numbers for   clinical significance. %   Auto Lymphocyte %: 21.7 %   Auto Monocyte %: 7.6 %   Auto Eosinophil %: 1.1 %   Auto Basophil %: 0.5 %   Auto Immature Granulocyte %: 0.4: (Includes meta, myelo and promyelocytes) %   Nucleated RBC: 0 /100 WBCs

## 2022-08-22 NOTE — PATIENT PROFILE ADULT - FALL HARM RISK - HARM RISK INTERVENTIONS

## 2022-08-22 NOTE — H&P ADULT - NSHPPHYSICALEXAM_GEN_ALL_CORE
GA: alert oriented x3  HearT: normal s1 s2  Lungs: clear  Abdomen: soft  LE: no edema  Neuro: UE 5/5, LE 4+/5 in RLE and 4/5 in LLE

## 2022-08-23 LAB
A1C WITH ESTIMATED AVERAGE GLUCOSE RESULT: 6.1 % — HIGH (ref 4–5.6)
ALBUMIN SERPL ELPH-MCNC: 4 G/DL — SIGNIFICANT CHANGE UP (ref 3.5–5.2)
ALP SERPL-CCNC: 52 U/L — SIGNIFICANT CHANGE UP (ref 30–115)
ALT FLD-CCNC: 21 U/L — SIGNIFICANT CHANGE UP (ref 0–41)
ANION GAP SERPL CALC-SCNC: 11 MMOL/L — SIGNIFICANT CHANGE UP (ref 7–14)
AST SERPL-CCNC: 20 U/L — SIGNIFICANT CHANGE UP (ref 0–41)
BASOPHILS # BLD AUTO: 0.05 K/UL — SIGNIFICANT CHANGE UP (ref 0–0.2)
BASOPHILS NFR BLD AUTO: 0.7 % — SIGNIFICANT CHANGE UP (ref 0–1)
BILIRUB SERPL-MCNC: 0.7 MG/DL — SIGNIFICANT CHANGE UP (ref 0.2–1.2)
BUN SERPL-MCNC: 27 MG/DL — HIGH (ref 10–20)
CALCIUM SERPL-MCNC: 8.6 MG/DL — SIGNIFICANT CHANGE UP (ref 8.5–10.1)
CHLORIDE SERPL-SCNC: 106 MMOL/L — SIGNIFICANT CHANGE UP (ref 98–110)
CHOLEST SERPL-MCNC: 127 MG/DL — SIGNIFICANT CHANGE UP
CO2 SERPL-SCNC: 25 MMOL/L — SIGNIFICANT CHANGE UP (ref 17–32)
CREAT SERPL-MCNC: 1.1 MG/DL — SIGNIFICANT CHANGE UP (ref 0.7–1.5)
EGFR: 80 ML/MIN/1.73M2 — SIGNIFICANT CHANGE UP
EOSINOPHIL # BLD AUTO: 0.21 K/UL — SIGNIFICANT CHANGE UP (ref 0–0.7)
EOSINOPHIL NFR BLD AUTO: 2.8 % — SIGNIFICANT CHANGE UP (ref 0–8)
ESTIMATED AVERAGE GLUCOSE: 128 MG/DL — HIGH (ref 68–114)
GLUCOSE BLDC GLUCOMTR-MCNC: 116 MG/DL — HIGH (ref 70–99)
GLUCOSE BLDC GLUCOMTR-MCNC: 127 MG/DL — HIGH (ref 70–99)
GLUCOSE BLDC GLUCOMTR-MCNC: 154 MG/DL — HIGH (ref 70–99)
GLUCOSE BLDC GLUCOMTR-MCNC: 210 MG/DL — HIGH (ref 70–99)
GLUCOSE SERPL-MCNC: 113 MG/DL — HIGH (ref 70–99)
HCT VFR BLD CALC: 44.6 % — SIGNIFICANT CHANGE UP (ref 42–52)
HDLC SERPL-MCNC: 41 MG/DL — SIGNIFICANT CHANGE UP
HGB BLD-MCNC: 15 G/DL — SIGNIFICANT CHANGE UP (ref 14–18)
IMM GRANULOCYTES NFR BLD AUTO: 0.4 % — HIGH (ref 0.1–0.3)
LIPID PNL WITH DIRECT LDL SERPL: 68 MG/DL — SIGNIFICANT CHANGE UP
LYMPHOCYTES # BLD AUTO: 2.94 K/UL — SIGNIFICANT CHANGE UP (ref 1.2–3.4)
LYMPHOCYTES # BLD AUTO: 39.1 % — SIGNIFICANT CHANGE UP (ref 20.5–51.1)
MCHC RBC-ENTMCNC: 30 PG — SIGNIFICANT CHANGE UP (ref 27–31)
MCHC RBC-ENTMCNC: 33.6 G/DL — SIGNIFICANT CHANGE UP (ref 32–37)
MCV RBC AUTO: 89.2 FL — SIGNIFICANT CHANGE UP (ref 80–94)
MONOCYTES # BLD AUTO: 0.82 K/UL — HIGH (ref 0.1–0.6)
MONOCYTES NFR BLD AUTO: 10.9 % — HIGH (ref 1.7–9.3)
NEUTROPHILS # BLD AUTO: 3.47 K/UL — SIGNIFICANT CHANGE UP (ref 1.4–6.5)
NEUTROPHILS NFR BLD AUTO: 46.1 % — SIGNIFICANT CHANGE UP (ref 42.2–75.2)
NON HDL CHOLESTEROL: 86 MG/DL — SIGNIFICANT CHANGE UP
NRBC # BLD: 0 /100 WBCS — SIGNIFICANT CHANGE UP (ref 0–0)
PLATELET # BLD AUTO: 212 K/UL — SIGNIFICANT CHANGE UP (ref 130–400)
POTASSIUM SERPL-MCNC: 4.3 MMOL/L — SIGNIFICANT CHANGE UP (ref 3.5–5)
POTASSIUM SERPL-SCNC: 4.3 MMOL/L — SIGNIFICANT CHANGE UP (ref 3.5–5)
PROT SERPL-MCNC: 6 G/DL — SIGNIFICANT CHANGE UP (ref 6–8)
RBC # BLD: 5 M/UL — SIGNIFICANT CHANGE UP (ref 4.7–6.1)
RBC # FLD: 13.2 % — SIGNIFICANT CHANGE UP (ref 11.5–14.5)
SODIUM SERPL-SCNC: 142 MMOL/L — SIGNIFICANT CHANGE UP (ref 135–146)
TRIGL SERPL-MCNC: 90 MG/DL — SIGNIFICANT CHANGE UP
WBC # BLD: 7.52 K/UL — SIGNIFICANT CHANGE UP (ref 4.8–10.8)
WBC # FLD AUTO: 7.52 K/UL — SIGNIFICANT CHANGE UP (ref 4.8–10.8)

## 2022-08-23 PROCEDURE — 99252 IP/OBS CONSLTJ NEW/EST SF 35: CPT

## 2022-08-23 PROCEDURE — 72147 MRI CHEST SPINE W/DYE: CPT | Mod: 26

## 2022-08-23 PROCEDURE — 72149 MRI LUMBAR SPINE W/DYE: CPT | Mod: 26

## 2022-08-23 PROCEDURE — 99233 SBSQ HOSP IP/OBS HIGH 50: CPT

## 2022-08-23 RX ORDER — INSULIN GLARGINE 100 [IU]/ML
6 INJECTION, SOLUTION SUBCUTANEOUS AT BEDTIME
Refills: 0 | Status: DISCONTINUED | OUTPATIENT
Start: 2022-08-23 | End: 2022-08-24

## 2022-08-23 RX ADMIN — CELECOXIB 200 MILLIGRAM(S): 200 CAPSULE ORAL at 17:42

## 2022-08-23 RX ADMIN — PANTOPRAZOLE SODIUM 40 MILLIGRAM(S): 20 TABLET, DELAYED RELEASE ORAL at 06:28

## 2022-08-23 RX ADMIN — RANOLAZINE 500 MILLIGRAM(S): 500 TABLET, FILM COATED, EXTENDED RELEASE ORAL at 17:42

## 2022-08-23 RX ADMIN — Medication 3 MILLIGRAM(S): at 21:47

## 2022-08-23 RX ADMIN — INSULIN GLARGINE 6 UNIT(S): 100 INJECTION, SOLUTION SUBCUTANEOUS at 21:47

## 2022-08-23 RX ADMIN — CELECOXIB 200 MILLIGRAM(S): 200 CAPSULE ORAL at 18:42

## 2022-08-23 RX ADMIN — ATORVASTATIN CALCIUM 40 MILLIGRAM(S): 80 TABLET, FILM COATED ORAL at 21:42

## 2022-08-23 RX ADMIN — LOSARTAN POTASSIUM 25 MILLIGRAM(S): 100 TABLET, FILM COATED ORAL at 05:51

## 2022-08-23 RX ADMIN — Medication 4: at 11:17

## 2022-08-23 RX ADMIN — Medication 180 MILLIGRAM(S): at 05:51

## 2022-08-23 RX ADMIN — ENOXAPARIN SODIUM 40 MILLIGRAM(S): 100 INJECTION SUBCUTANEOUS at 11:16

## 2022-08-23 RX ADMIN — Medication 3 UNIT(S): at 11:16

## 2022-08-23 RX ADMIN — Medication 81 MILLIGRAM(S): at 11:16

## 2022-08-23 RX ADMIN — RANOLAZINE 500 MILLIGRAM(S): 500 TABLET, FILM COATED, EXTENDED RELEASE ORAL at 05:51

## 2022-08-23 RX ADMIN — CELECOXIB 200 MILLIGRAM(S): 200 CAPSULE ORAL at 05:52

## 2022-08-23 NOTE — CONSULT NOTE ADULT - ASSESSMENT
Assessment:51 year old man with PMHx of HTN, HLD, DMII, hiatal hernia, herniated discs, CAD on medical tx , chronic low back pain is presenting with severe low back pain and weakness in both of his LE since Friday.Patient states his symptoms started on last Friday progressively got worse. On Sunday, patient had a fall due to the weakness in his legs. No LOC  Patient denies any fever or chills. No urinary incontinence or retention. No stool incontinence and no numbness in his pelvic area. MR lumbar spine /thoracic spine are not significant.  A1c-6.1    Suggestions:  X-ray b/l hip  Pain management consult  PT  Discussed with Dr. Ribera. Pending final attending attestation to follow  Assessment:51 year old man with PMHx of HTN, HLD, DMII, hiatal hernia, herniated discs, CAD on medical tx , chronic low back pain is presenting with severe low back pain and weakness in both of his LE since Friday.Patient states his symptoms started on last Friday progressively got worse. On Sunday, patient had a fall due to the weakness in his legs. No LOC  Patient denies any fever or chills. No urinary incontinence or retention. No stool incontinence and no numbness in his pelvic area. MR lumbar spine /thoracic spine are not significant.  A1c-6.1    Suggestions:  X-ray b/l hip  Pain management consult  PT  Can use NSAID and Muscle relaxers PRN if no contraindications  F/u in Neurology clinic as out patient ([phone]) for possible EMG/NCS

## 2022-08-23 NOTE — PROGRESS NOTE ADULT - SUBJECTIVE AND OBJECTIVE BOX
BELIA CH 51y Male  MRN#: [id number]   CODE STATUS:________    Hospital Day: 1d    Pt is currently admitted with the primary diagnosis of radiculopathy    Overnight events   -No major overnight events                                            ----------------------------------------------------------  OBJECTIVE  PAST MEDICAL & SURGICAL HISTORY  Essential hypertension    Diabetes    Hypercholesteremia    CAD (coronary artery disease)    H/O percutaneous left heart catheterization                                              -----------------------------------------------------------  ALLERGIES:  Allergy Status Unknown                                            ------------------------------------------------------------    HOME MEDICATIONS  Home Medications:  Aspirin Low Dose 81 mg oral delayed release tablet: 1 tab(s) orally once a day (22 Aug 2022 05:27)  atorvastatin 40 mg oral tablet: 1 tab(s) orally once a day (22 Aug 2022 05:)  Cartia  mg/24 hours oral capsule, extended release: 1 cap(s) orally once a day (22 Aug 2022 05:27)  empagliflozin 25 mg oral tablet: 1 tab(s) orally once a day (in the morning) (22 Aug 2022 05:)  losartan 25 mg oral tablet: 1 tab(s) orally once a day (22 Aug 2022 05:)  pantoprazole 40 mg oral delayed release tablet: 1 tab(s) orally once a day (22 Aug 2022 05:27)  pioglitazone 45 mg oral tablet: 1 tab(s) orally once a day (22 Aug 2022 05:27)  ranolazine 500 mg oral granule, extended release: 1 tab(s) orally once a day (22 Aug 2022 05:27)  tiZANidine 4 mg oral tablet: 2 tab(s) orally every 8 hours, As Needed (22 Aug 2022 05:27)  Tylenol 325 mg oral tablet: 2 tab(s) orally every 4 hours (22 Aug 2022 05:27)                           MEDICATIONS:  STANDING MEDICATIONS  aspirin enteric coated 81 milliGRAM(s) Oral daily  atorvastatin 40 milliGRAM(s) Oral at bedtime  celecoxib 200 milliGRAM(s) Oral two times a day  dextrose 5%. 1000 milliLiter(s) IV Continuous <Continuous>  dextrose 5%. 1000 milliLiter(s) IV Continuous <Continuous>  dextrose 50% Injectable 25 Gram(s) IV Push once  dextrose 50% Injectable 12.5 Gram(s) IV Push once  dextrose 50% Injectable 25 Gram(s) IV Push once  diltiazem    milliGRAM(s) Oral daily  enoxaparin Injectable 40 milliGRAM(s) SubCutaneous every 24 hours  glucagon  Injectable 1 milliGRAM(s) IntraMuscular once  insulin glargine Injectable (LANTUS) 12 Unit(s) SubCutaneous at bedtime  insulin lispro (ADMELOG) corrective regimen sliding scale   SubCutaneous three times a day before meals  insulin lispro Injectable (ADMELOG) 3 Unit(s) SubCutaneous three times a day before meals  losartan 25 milliGRAM(s) Oral daily  pantoprazole    Tablet 40 milliGRAM(s) Oral before breakfast  ranolazine 500 milliGRAM(s) Oral two times a day    PRN MEDICATIONS  acetaminophen     Tablet .. 650 milliGRAM(s) Oral every 6 hours PRN  cyclobenzaprine 10 milliGRAM(s) Oral three times a day PRN  dextrose Oral Gel 15 Gram(s) Oral once PRN  melatonin 3 milliGRAM(s) Oral at bedtime PRN                                            ------------------------------------------------------------  VITAL SIGNS: Last 24 Hours  T(C): 36.8 (23 Aug 2022 00:00), Max: 36.8 (23 Aug 2022 00:00)  T(F): 98.2 (23 Aug 2022 00:00), Max: 98.2 (23 Aug 2022 00:00)  HR: 61 (23 Aug 2022 04:58) (59 - 68)  BP: 103/67 (23 Aug 2022 04:58) (102/63 - 110/59)  BP(mean): --  RR: 18 (23 Aug 2022 04:58) (18 - 18)  SpO2: --                                             --------------------------------------------------------------  LABS:                        15.8   7.12  )-----------( 221      ( 22 Aug 2022 12:00 )             46.2         145  |  108  |  21<H>  ----------------------------<  118<H>  4.0   |  27  |  1.0    Ca    8.9      22 Aug 2022 12:00    TPro  6.3  /  Alb  4.3  /  TBili  1.0  /  DBili  x   /  AST  23  /  ALT  25  /  AlkPhos  57        Urinalysis Basic - ( 21 Aug 2022 23:43 )    Color: Yellow / Appearance: Clear / S.043 / pH: x  Gluc: x / Ketone: Small  / Bili: Negative / Urobili: <2 mg/dL   Blood: x / Protein: Trace / Nitrite: Negative   Leuk Esterase: Negative / RBC: x / WBC x   Sq Epi: x / Non Sq Epi: x / Bacteria: x        Creatine Kinase, Serum: 98 U/L (22 @ 18:16)          CARDIAC MARKERS ( 22 Aug 2022 18:16 )  x     / x     / 98 U/L / x     / x      CARDIAC MARKERS ( 21 Aug 2022 21:42 )  x     / x     / 90 U/L / x     / x                                                    --------------------------------------------------------------    PHYSICAL EXAM:  GENERAL: Awake, alert, oriented to person, place, time, situation. Well-nourished, laying in bed appearing in no acute distress  HEENT: No FNDs, atraumatic, normocephalic  LUNGS: Clear to auscultation bilaterally  HEART: S1/S2. No heaves or thrills  ABD: Soft, non-tender, non-distended.  EXT/NEURO: No vertebral tenderness. 5/5 strength in all extremity joints. Sensation and ROM grossly intact.  SKIN: No edema      PLAN:  51 year old man with PMHx of HTN, HLD, DMII, hiatal hernia, herniated discs, CAD on medical tx , chronic low back pain is presenting with 1 day history of severe low back pain and weakness in both of his LE.    #Radicular Pain  -pt states progressive lumbago + leg pain/numbness since  with fall on ground for hours   - Patient with low back and neurologic sequale, r/o nerve impingement  -CK 90, 98  -XR lumbar- mild spondylosis  - Pain killers- flexeril, celecoxib  - f/u PT  -f/u NSG  -f/u MR lumbosacral/thoracic spine    #HTN:  - On losartan, diltiazem    #DMII  - Lantus/Lispro  - SS  - f/u A1c give UA>1000 gluc    #CAD  - on Aspirin    Activity: as tolerated, PT  Diet: DASH/carbs cons  DVT prophylaxis: lovenox 40 mg  GI prophylaxis: pantoprazole  Dispo: floor    #Progress Note Handoff  Pending (specify): MR spine, PT, NSG  Family discussion:   Disposition:    BELIA CH 51y Male  MRN#: [id number]   CODE STATUS:________    Hospital Day: 1d    Pt is currently admitted with the primary diagnosis of radiculopathy    Overnight events   -No major overnight events                                            ----------------------------------------------------------  OBJECTIVE  PAST MEDICAL & SURGICAL HISTORY  Essential hypertension    Diabetes    Hypercholesteremia    CAD (coronary artery disease)    H/O percutaneous left heart catheterization                                              -----------------------------------------------------------  ALLERGIES:  Allergy Status Unknown                                            ------------------------------------------------------------    HOME MEDICATIONS  Home Medications:  Aspirin Low Dose 81 mg oral delayed release tablet: 1 tab(s) orally once a day (22 Aug 2022 05:27)  atorvastatin 40 mg oral tablet: 1 tab(s) orally once a day (22 Aug 2022 05:)  Cartia  mg/24 hours oral capsule, extended release: 1 cap(s) orally once a day (22 Aug 2022 05:27)  empagliflozin 25 mg oral tablet: 1 tab(s) orally once a day (in the morning) (22 Aug 2022 05:)  losartan 25 mg oral tablet: 1 tab(s) orally once a day (22 Aug 2022 05:)  pantoprazole 40 mg oral delayed release tablet: 1 tab(s) orally once a day (22 Aug 2022 05:27)  pioglitazone 45 mg oral tablet: 1 tab(s) orally once a day (22 Aug 2022 05:27)  ranolazine 500 mg oral granule, extended release: 1 tab(s) orally once a day (22 Aug 2022 05:27)  tiZANidine 4 mg oral tablet: 2 tab(s) orally every 8 hours, As Needed (22 Aug 2022 05:27)  Tylenol 325 mg oral tablet: 2 tab(s) orally every 4 hours (22 Aug 2022 05:27)                           MEDICATIONS:  STANDING MEDICATIONS  aspirin enteric coated 81 milliGRAM(s) Oral daily  atorvastatin 40 milliGRAM(s) Oral at bedtime  celecoxib 200 milliGRAM(s) Oral two times a day  dextrose 5%. 1000 milliLiter(s) IV Continuous <Continuous>  dextrose 5%. 1000 milliLiter(s) IV Continuous <Continuous>  dextrose 50% Injectable 25 Gram(s) IV Push once  dextrose 50% Injectable 12.5 Gram(s) IV Push once  dextrose 50% Injectable 25 Gram(s) IV Push once  diltiazem    milliGRAM(s) Oral daily  enoxaparin Injectable 40 milliGRAM(s) SubCutaneous every 24 hours  glucagon  Injectable 1 milliGRAM(s) IntraMuscular once  insulin glargine Injectable (LANTUS) 12 Unit(s) SubCutaneous at bedtime  insulin lispro (ADMELOG) corrective regimen sliding scale   SubCutaneous three times a day before meals  insulin lispro Injectable (ADMELOG) 3 Unit(s) SubCutaneous three times a day before meals  losartan 25 milliGRAM(s) Oral daily  pantoprazole    Tablet 40 milliGRAM(s) Oral before breakfast  ranolazine 500 milliGRAM(s) Oral two times a day    PRN MEDICATIONS  acetaminophen     Tablet .. 650 milliGRAM(s) Oral every 6 hours PRN  cyclobenzaprine 10 milliGRAM(s) Oral three times a day PRN  dextrose Oral Gel 15 Gram(s) Oral once PRN  melatonin 3 milliGRAM(s) Oral at bedtime PRN                                            ------------------------------------------------------------  VITAL SIGNS: Last 24 Hours  T(C): 36.8 (23 Aug 2022 00:00), Max: 36.8 (23 Aug 2022 00:00)  T(F): 98.2 (23 Aug 2022 00:00), Max: 98.2 (23 Aug 2022 00:00)  HR: 61 (23 Aug 2022 04:58) (59 - 68)  BP: 103/67 (23 Aug 2022 04:58) (102/63 - 110/59)  BP(mean): --  RR: 18 (23 Aug 2022 04:58) (18 - 18)  SpO2: --                                             --------------------------------------------------------------  LABS:                        15.8   7.12  )-----------( 221      ( 22 Aug 2022 12:00 )             46.2         145  |  108  |  21<H>  ----------------------------<  118<H>  4.0   |  27  |  1.0    Ca    8.9      22 Aug 2022 12:00    TPro  6.3  /  Alb  4.3  /  TBili  1.0  /  DBili  x   /  AST  23  /  ALT  25  /  AlkPhos  57        Urinalysis Basic - ( 21 Aug 2022 23:43 )    Color: Yellow / Appearance: Clear / S.043 / pH: x  Gluc: x / Ketone: Small  / Bili: Negative / Urobili: <2 mg/dL   Blood: x / Protein: Trace / Nitrite: Negative   Leuk Esterase: Negative / RBC: x / WBC x   Sq Epi: x / Non Sq Epi: x / Bacteria: x        Creatine Kinase, Serum: 98 U/L (22 @ 18:16)          CARDIAC MARKERS ( 22 Aug 2022 18:16 )  x     / x     / 98 U/L / x     / x      CARDIAC MARKERS ( 21 Aug 2022 21:42 )  x     / x     / 90 U/L / x     / x                                                    --------------------------------------------------------------    PHYSICAL EXAM:  GENERAL: Awake, alert, oriented to person, place, time, situation. Well-nourished, laying in bed appearing in no acute distress  HEENT: No FNDs, atraumatic, normocephalic  LUNGS: Clear to auscultation bilaterally  HEART: S1/S2. No heaves or thrills  ABD: Soft, non-tender, non-distended.  EXT/NEURO: Vertebral tenderness with muscle spasm over most of the lumbar spine.  5/5 strength in all extremity joints. Off and on paresthesias of LE  SKIN: No edema      PLAN:  51 year old man with PMHx of HTN, HLD, DMII, hiatal hernia, herniated discs, CAD on medical tx , chronic low back pain is presenting with 1 day history of severe low back pain and weakness in both of his LE.    #Radicular Pain  -pt states progressive lumbago + leg pain/numbness since  with fall on ground for hours   - Patient with low back and neurologic sequale, r/o nerve impingement  -CK 90, 98  -XR lumbar- mild spondylosis  - Pain killers- flexeril, celecoxib  - f/u PT  -f/u NSG  -f/u MR lumbosacral/thoracic spine    #HTN:  - On losartan, diltiazem    #DMII  - Lantus/Lispro  - SS  - f/u A1c give UA>1000 gluc    #CAD  - on Aspirin    Activity: as tolerated, PT  Diet: DASH/carbs cons  DVT prophylaxis: lovenox 40 mg  GI prophylaxis: pantoprazole  Dispo: floor    #Progress Note Handoff  Pending (specify): MR spine, PT, NSG  Family discussion:   Disposition:

## 2022-08-23 NOTE — CONSULT NOTE ADULT - NS ATTEND AMEND GEN_ALL_CORE FT
Patient seen and examined and agree with above except as noted.  Patients history, notes, labs, imaging, vitals and meds reviewed personally.  Patients pain improving but still present.  Back pain worse on left radiating down left > right  Exam notable for 4+/5 in right big toe dorsiflexion pain limited in Hip flexors b/l  mild pain on rotation of L>R hip    Plan as above

## 2022-08-23 NOTE — PROGRESS NOTE ADULT - ASSESSMENT
51 year old man with PMHx of HTN, HLD, DMII, hiatal hernia, herniated discs, CAD on medical tx , chronic low back pain is presenting with 1 day history of severe low back pain and weakness in both of his LE. pt states progressive lumbago + leg pain/numbness since 8/20 with fall on ground for hours 8/21, CK 90, 98, XR lumbar- mild spondylosis,  MR lumbosacral/thoracic spine    severe low back pain and weakness in both of his LE  chronic low back pain  Hx pf Herniated discs  CAD on medical tx  HTN, HLD, DMII    plan:  neurosurgery and pt consulted    consulted for SY  Pain killers- flexeril, celecoxib  f/u MR lumbosacral/thoracic spine  c/w bp medication, asa   Lantus/Lispro, JOSIAS  f/u A1c give UA>1000 gluc  fall precaution   trend cbc and bmp  vitals q 8hrs      Activity: as tolerated, PT  Diet: DASH/carbs cons  DVT prophylaxis: lovenox 40 mg  GI prophylaxis: pantoprazole  Dispo: pending MR spine,  SY vs home pending medical optimization     plan discussed with patient, resident and .

## 2022-08-23 NOTE — PROGRESS NOTE ADULT - SUBJECTIVE AND OBJECTIVE BOX
BLEIA CH 51y Male  MRN#: [id number]   Hospital Day: 1d    SUBJECTIVE  Patient is a 51y old Male who presents with a chief complaint of back pain and LE weakness (23 Aug 2022 18:18)  Currently admitted to medicine with the primary diagnosis of Back pain      INTERVAL HPI AND OVERNIGHT EVENTS:  Patient was examined and seen at bedside. This morning he is resting comfortably in bed and reports no issues or overnight events.  denies chest pain , sob, n/v/d/c, hematuria or bloody stool.     REVIEW OF SYMPTOMS:  10 point ROS negative except as above.  OBJECTIVE  PAST MEDICAL & SURGICAL HISTORY  PMH OF Essential hypertension, Diabetes type II, Hypercholesteremia, CAD (coronary artery disease), H/O percutaneous left heart catheterization      ALLERGIES:  Allergy Status Unknown    MEDICATIONS:  STANDING MEDICATIONS  aspirin enteric coated 81 milliGRAM(s) Oral daily  atorvastatin 40 milliGRAM(s) Oral at bedtime  celecoxib 200 milliGRAM(s) Oral two times a day  dextrose 5%. 1000 milliLiter(s) IV Continuous <Continuous>  dextrose 5%. 1000 milliLiter(s) IV Continuous <Continuous>  dextrose 50% Injectable 25 Gram(s) IV Push once  dextrose 50% Injectable 12.5 Gram(s) IV Push once  dextrose 50% Injectable 25 Gram(s) IV Push once  diltiazem    milliGRAM(s) Oral daily  enoxaparin Injectable 40 milliGRAM(s) SubCutaneous every 24 hours  glucagon  Injectable 1 milliGRAM(s) IntraMuscular once  insulin glargine Injectable (LANTUS) 6 Unit(s) SubCutaneous at bedtime  insulin lispro (ADMELOG) corrective regimen sliding scale   SubCutaneous three times a day before meals  insulin lispro Injectable (ADMELOG) 3 Unit(s) SubCutaneous three times a day before meals  losartan 25 milliGRAM(s) Oral daily  pantoprazole    Tablet 40 milliGRAM(s) Oral before breakfast  ranolazine 500 milliGRAM(s) Oral two times a day    PRN MEDICATIONS  acetaminophen     Tablet .. 650 milliGRAM(s) Oral every 6 hours PRN  cyclobenzaprine 10 milliGRAM(s) Oral three times a day PRN  dextrose Oral Gel 15 Gram(s) Oral once PRN  melatonin 3 milliGRAM(s) Oral at bedtime PRN      VITAL SIGNS: Last 24 Hours  T(C): 36.6 (23 Aug 2022 16:04), Max: 36.8 (23 Aug 2022 00:00)  T(F): 97.9 (23 Aug 2022 16:04), Max: 98.2 (23 Aug 2022 00:00)  HR: 71 (23 Aug 2022 16:04) (61 - 71)  BP: 104/71 (23 Aug 2022 16:04) (102/63 - 104/71)  BP(mean): --  RR: 18 (23 Aug 2022 16:04) (18 - 18)  SpO2: --  PHYSICAL EXAM:  CONSTITUTIONAL: No acute distress, well-developed, well-groomed, AAOx3  HEAD: Atraumatic, normocephalic  ENT: Supple, no masses, no thyromegaly,   PULMONARY: Clear to auscultation bilaterally; no wheezes, rales, or rhonchi  CARDIOVASCULAR: Regular rate and rhythm; no murmurs, rubs, or gallops  GASTROINTESTINAL: Soft, non-tender, non-distended; bowel sounds present  MUSCULOSKELETAL: 2+ peripheral pulses; no clubbing, no cyanosis, no edema  NEUROLOGY: non-focal, generalized weakness and back pain   SKIN: No rashes or lesions; warm and dry    LABS:                        15.0   7.52  )-----------( 212      ( 23 Aug 2022 06:31 )             44.6     08-23    142  |  106  |  27<H>  ----------------------------<  113<H>  4.3   |  25  |  1.1    Ca    8.6      23 Aug 2022 06:31    TPro  6.0  /  Alb  4.0  /  TBili  0.7  /  DBili  x   /  AST  20  /  ALT  21  /  AlkPhos  52  08-23    Urinalysis Basic - ( 21 Aug 2022 23:43 )    Color: Yellow / Appearance: Clear / S.043 / pH: x  Gluc: x / Ketone: Small  / Bili: Negative / Urobili: <2 mg/dL   Blood: x / Protein: Trace / Nitrite: Negative   Leuk Esterase: Negative / RBC: x / WBC x   Sq Epi: x / Non Sq Epi: x / Bacteria: x    CARDIAC MARKERS ( 22 Aug 2022 18:16 )  x     / x     / 98 U/L / x     / x      CARDIAC MARKERS ( 21 Aug 2022 21:42 )  x     / x     / 90 U/L / x     / x          RADIOLOGY:  reviewed

## 2022-08-24 ENCOUNTER — TRANSCRIPTION ENCOUNTER (OUTPATIENT)
Age: 52
End: 2022-08-24

## 2022-08-24 VITALS
DIASTOLIC BLOOD PRESSURE: 72 MMHG | TEMPERATURE: 98 F | HEART RATE: 71 BPM | RESPIRATION RATE: 18 BRPM | SYSTOLIC BLOOD PRESSURE: 115 MMHG

## 2022-08-24 DIAGNOSIS — M54.42 LUMBAGO WITH SCIATICA, LEFT SIDE: ICD-10-CM

## 2022-08-24 LAB
ALBUMIN SERPL ELPH-MCNC: 4 G/DL — SIGNIFICANT CHANGE UP (ref 3.5–5.2)
ALP SERPL-CCNC: 48 U/L — SIGNIFICANT CHANGE UP (ref 30–115)
ALT FLD-CCNC: 25 U/L — SIGNIFICANT CHANGE UP (ref 0–41)
ANION GAP SERPL CALC-SCNC: 6 MMOL/L — LOW (ref 7–14)
AST SERPL-CCNC: 22 U/L — SIGNIFICANT CHANGE UP (ref 0–41)
BASOPHILS # BLD AUTO: 0.04 K/UL — SIGNIFICANT CHANGE UP (ref 0–0.2)
BASOPHILS NFR BLD AUTO: 0.7 % — SIGNIFICANT CHANGE UP (ref 0–1)
BILIRUB SERPL-MCNC: 0.7 MG/DL — SIGNIFICANT CHANGE UP (ref 0.2–1.2)
BUN SERPL-MCNC: 23 MG/DL — HIGH (ref 10–20)
CALCIUM SERPL-MCNC: 8.7 MG/DL — SIGNIFICANT CHANGE UP (ref 8.5–10.1)
CHLORIDE SERPL-SCNC: 104 MMOL/L — SIGNIFICANT CHANGE UP (ref 98–110)
CO2 SERPL-SCNC: 28 MMOL/L — SIGNIFICANT CHANGE UP (ref 17–32)
CREAT SERPL-MCNC: 0.9 MG/DL — SIGNIFICANT CHANGE UP (ref 0.7–1.5)
EGFR: 102 ML/MIN/1.73M2 — SIGNIFICANT CHANGE UP
EOSINOPHIL # BLD AUTO: 0.18 K/UL — SIGNIFICANT CHANGE UP (ref 0–0.7)
EOSINOPHIL NFR BLD AUTO: 3.2 % — SIGNIFICANT CHANGE UP (ref 0–8)
GLUCOSE BLDC GLUCOMTR-MCNC: 118 MG/DL — HIGH (ref 70–99)
GLUCOSE BLDC GLUCOMTR-MCNC: 121 MG/DL — HIGH (ref 70–99)
GLUCOSE BLDC GLUCOMTR-MCNC: 182 MG/DL — HIGH (ref 70–99)
GLUCOSE SERPL-MCNC: 118 MG/DL — HIGH (ref 70–99)
HCT VFR BLD CALC: 44.4 % — SIGNIFICANT CHANGE UP (ref 42–52)
HGB BLD-MCNC: 15.5 G/DL — SIGNIFICANT CHANGE UP (ref 14–18)
IMM GRANULOCYTES NFR BLD AUTO: 0.2 % — SIGNIFICANT CHANGE UP (ref 0.1–0.3)
LYMPHOCYTES # BLD AUTO: 2.38 K/UL — SIGNIFICANT CHANGE UP (ref 1.2–3.4)
LYMPHOCYTES # BLD AUTO: 43 % — SIGNIFICANT CHANGE UP (ref 20.5–51.1)
MAGNESIUM SERPL-MCNC: 1.7 MG/DL — LOW (ref 1.8–2.4)
MCHC RBC-ENTMCNC: 31.2 PG — HIGH (ref 27–31)
MCHC RBC-ENTMCNC: 34.9 G/DL — SIGNIFICANT CHANGE UP (ref 32–37)
MCV RBC AUTO: 89.3 FL — SIGNIFICANT CHANGE UP (ref 80–94)
MONOCYTES # BLD AUTO: 0.56 K/UL — SIGNIFICANT CHANGE UP (ref 0.1–0.6)
MONOCYTES NFR BLD AUTO: 10.1 % — HIGH (ref 1.7–9.3)
NEUTROPHILS # BLD AUTO: 2.37 K/UL — SIGNIFICANT CHANGE UP (ref 1.4–6.5)
NEUTROPHILS NFR BLD AUTO: 42.8 % — SIGNIFICANT CHANGE UP (ref 42.2–75.2)
NRBC # BLD: 0 /100 WBCS — SIGNIFICANT CHANGE UP (ref 0–0)
PLATELET # BLD AUTO: 193 K/UL — SIGNIFICANT CHANGE UP (ref 130–400)
POTASSIUM SERPL-MCNC: 4.3 MMOL/L — SIGNIFICANT CHANGE UP (ref 3.5–5)
POTASSIUM SERPL-SCNC: 4.3 MMOL/L — SIGNIFICANT CHANGE UP (ref 3.5–5)
PROT SERPL-MCNC: 6.1 G/DL — SIGNIFICANT CHANGE UP (ref 6–8)
RBC # BLD: 4.97 M/UL — SIGNIFICANT CHANGE UP (ref 4.7–6.1)
RBC # FLD: 13.1 % — SIGNIFICANT CHANGE UP (ref 11.5–14.5)
SODIUM SERPL-SCNC: 138 MMOL/L — SIGNIFICANT CHANGE UP (ref 135–146)
WBC # BLD: 5.54 K/UL — SIGNIFICANT CHANGE UP (ref 4.8–10.8)
WBC # FLD AUTO: 5.54 K/UL — SIGNIFICANT CHANGE UP (ref 4.8–10.8)

## 2022-08-24 PROCEDURE — 73521 X-RAY EXAM HIPS BI 2 VIEWS: CPT | Mod: 26

## 2022-08-24 PROCEDURE — 99233 SBSQ HOSP IP/OBS HIGH 50: CPT

## 2022-08-24 PROCEDURE — 99222 1ST HOSP IP/OBS MODERATE 55: CPT

## 2022-08-24 RX ORDER — ACETAMINOPHEN 500 MG
2 TABLET ORAL
Qty: 180 | Refills: 0
Start: 2022-08-24 | End: 2022-09-22

## 2022-08-24 RX ORDER — CELECOXIB 200 MG/1
1 CAPSULE ORAL
Qty: 60 | Refills: 0
Start: 2022-08-24 | End: 2022-09-22

## 2022-08-24 RX ORDER — MAGNESIUM SULFATE 500 MG/ML
2 VIAL (ML) INJECTION ONCE
Refills: 0 | Status: DISCONTINUED | OUTPATIENT
Start: 2022-08-24 | End: 2022-08-24

## 2022-08-24 RX ORDER — CYCLOBENZAPRINE HYDROCHLORIDE 10 MG/1
10 TABLET, FILM COATED ORAL THREE TIMES A DAY
Refills: 0 | Status: DISCONTINUED | OUTPATIENT
Start: 2022-08-24 | End: 2022-08-24

## 2022-08-24 RX ORDER — ACETAMINOPHEN 500 MG
2 TABLET ORAL
Qty: 0 | Refills: 0 | DISCHARGE

## 2022-08-24 RX ORDER — CELECOXIB 200 MG/1
1 CAPSULE ORAL
Qty: 0 | Refills: 0 | DISCHARGE
Start: 2022-08-24

## 2022-08-24 RX ORDER — CYCLOBENZAPRINE HYDROCHLORIDE 10 MG/1
1 TABLET, FILM COATED ORAL
Qty: 0 | Refills: 0 | DISCHARGE
Start: 2022-08-24

## 2022-08-24 RX ORDER — ACETAMINOPHEN 500 MG
1000 TABLET ORAL EVERY 8 HOURS
Refills: 0 | Status: DISCONTINUED | OUTPATIENT
Start: 2022-08-24 | End: 2022-08-24

## 2022-08-24 RX ORDER — CYCLOBENZAPRINE HYDROCHLORIDE 10 MG/1
1 TABLET, FILM COATED ORAL
Qty: 90 | Refills: 0
Start: 2022-08-24 | End: 2022-09-22

## 2022-08-24 RX ADMIN — Medication 2: at 11:31

## 2022-08-24 RX ADMIN — ENOXAPARIN SODIUM 40 MILLIGRAM(S): 100 INJECTION SUBCUTANEOUS at 11:31

## 2022-08-24 RX ADMIN — Medication 1000 MILLIGRAM(S): at 15:49

## 2022-08-24 RX ADMIN — Medication 81 MILLIGRAM(S): at 11:29

## 2022-08-24 RX ADMIN — Medication 3 UNIT(S): at 11:30

## 2022-08-24 RX ADMIN — Medication 3 UNIT(S): at 07:24

## 2022-08-24 RX ADMIN — CELECOXIB 200 MILLIGRAM(S): 200 CAPSULE ORAL at 17:50

## 2022-08-24 RX ADMIN — Medication 1000 MILLIGRAM(S): at 16:49

## 2022-08-24 RX ADMIN — CELECOXIB 200 MILLIGRAM(S): 200 CAPSULE ORAL at 06:03

## 2022-08-24 RX ADMIN — CYCLOBENZAPRINE HYDROCHLORIDE 10 MILLIGRAM(S): 10 TABLET, FILM COATED ORAL at 16:26

## 2022-08-24 RX ADMIN — RANOLAZINE 500 MILLIGRAM(S): 500 TABLET, FILM COATED, EXTENDED RELEASE ORAL at 17:50

## 2022-08-24 RX ADMIN — PANTOPRAZOLE SODIUM 40 MILLIGRAM(S): 20 TABLET, DELAYED RELEASE ORAL at 06:03

## 2022-08-24 RX ADMIN — RANOLAZINE 500 MILLIGRAM(S): 500 TABLET, FILM COATED, EXTENDED RELEASE ORAL at 06:03

## 2022-08-24 NOTE — CONSULT NOTE ADULT - ASSESSMENT
Patient is a 50 y/o man with history of DM, HTN, HL, CAD, and GERD who was admitted on 8/22/2022 with acute on chronic low back pain.

## 2022-08-24 NOTE — DISCHARGE NOTE PROVIDER - PROVIDER TOKENS
PROVIDER:[TOKEN:[[id number]],FOLLOWUP:[1-3 days]],PROVIDER:[TOKEN:[[id number]],FOLLOWUP:[1 week]]

## 2022-08-24 NOTE — DISCHARGE NOTE NURSING/CASE MANAGEMENT/SOCIAL WORK - PATIENT PORTAL LINK FT
You can access the FollowMyHealth Patient Portal offered by [facility] by registering at the following website: [email or web address]. By joining [employer]’s FollowMyHealth portal, you will also be able to view your health information using other applications (apps) compatible with our system.

## 2022-08-24 NOTE — PHYSICAL THERAPY INITIAL EVALUATION ADULT - ADDITIONAL COMMENTS
Patient lives alone in house with 6 steps to enter and 12 steps to basement. Was independent in ASDL's and ambulation without assistive device.

## 2022-08-24 NOTE — DISCHARGE NOTE NURSING/CASE MANAGEMENT/SOCIAL WORK - NSDCPEFALRISK_GEN_ALL_CORE
For information on Fall & Injury Prevention, visit: [email or web address] OR  [email or web address] OR  https://www.cdc.gov/steadi/patient.html

## 2022-08-24 NOTE — CONSULT NOTE ADULT - PROBLEM SELECTOR RECOMMENDATION 9
No history of chronic opioid therapy. Low risk of opioid abuse per ORT.  1) Will defer any interventional procedures at this time  2) Continue celecoxib 200mg BID  3) Change acetaminophen to 1000mg Q8h standing   4) Change cyclobenzaprine to 10mg TID standing  5) Encouraged patient to follow up with physical therapy as an outpatient

## 2022-08-24 NOTE — PROGRESS NOTE ADULT - REASON FOR ADMISSION
back pain and LE weakness

## 2022-08-24 NOTE — PROGRESS NOTE ADULT - SUBJECTIVE AND OBJECTIVE BOX
BELIA CH 51y Male  MRN#: [id number]   CODE STATUS:________    Hospital Day: 2d    Pt is currently admitted with the primary diagnosis of radiculopathy    Overnight events   -No major overnight events                                          ----------------------------------------------------------  OBJECTIVE  PAST MEDICAL & SURGICAL HISTORY  Essential hypertension    Diabetes    Hypercholesteremia    CAD (coronary artery disease)    H/O percutaneous left heart catheterization                                              -----------------------------------------------------------  ALLERGIES:  Allergy Status Unknown                                            ------------------------------------------------------------    HOME MEDICATIONS  Home Medications:  Aspirin Low Dose 81 mg oral delayed release tablet: 1 tab(s) orally once a day (22 Aug 2022 05:27)  atorvastatin 40 mg oral tablet: 1 tab(s) orally once a day (22 Aug 2022 05:27)  Cartia  mg/24 hours oral capsule, extended release: 1 cap(s) orally once a day (22 Aug 2022 05:27)  empagliflozin 25 mg oral tablet: 1 tab(s) orally once a day (in the morning) (22 Aug 2022 05:27)  losartan 25 mg oral tablet: 1 tab(s) orally once a day (22 Aug 2022 05:27)  pantoprazole 40 mg oral delayed release tablet: 1 tab(s) orally once a day (22 Aug 2022 05:27)  pioglitazone 45 mg oral tablet: 1 tab(s) orally once a day (22 Aug 2022 05:27)  ranolazine 500 mg oral granule, extended release: 1 tab(s) orally once a day (22 Aug 2022 05:27)  tiZANidine 4 mg oral tablet: 2 tab(s) orally every 8 hours, As Needed (22 Aug 2022 05:27)  Tylenol 325 mg oral tablet: 2 tab(s) orally every 4 hours (22 Aug 2022 05:27)                           MEDICATIONS:  STANDING MEDICATIONS  aspirin enteric coated 81 milliGRAM(s) Oral daily  atorvastatin 40 milliGRAM(s) Oral at bedtime  celecoxib 200 milliGRAM(s) Oral two times a day  dextrose 5%. 1000 milliLiter(s) IV Continuous <Continuous>  dextrose 5%. 1000 milliLiter(s) IV Continuous <Continuous>  dextrose 50% Injectable 25 Gram(s) IV Push once  dextrose 50% Injectable 12.5 Gram(s) IV Push once  dextrose 50% Injectable 25 Gram(s) IV Push once  diltiazem    milliGRAM(s) Oral daily  enoxaparin Injectable 40 milliGRAM(s) SubCutaneous every 24 hours  glucagon  Injectable 1 milliGRAM(s) IntraMuscular once  insulin glargine Injectable (LANTUS) 6 Unit(s) SubCutaneous at bedtime  insulin lispro (ADMELOG) corrective regimen sliding scale   SubCutaneous three times a day before meals  insulin lispro Injectable (ADMELOG) 3 Unit(s) SubCutaneous three times a day before meals  losartan 25 milliGRAM(s) Oral daily  pantoprazole    Tablet 40 milliGRAM(s) Oral before breakfast  ranolazine 500 milliGRAM(s) Oral two times a day    PRN MEDICATIONS  acetaminophen     Tablet .. 650 milliGRAM(s) Oral every 6 hours PRN  cyclobenzaprine 10 milliGRAM(s) Oral three times a day PRN  dextrose Oral Gel 15 Gram(s) Oral once PRN  melatonin 3 milliGRAM(s) Oral at bedtime PRN                                            ------------------------------------------------------------  VITAL SIGNS: Last 24 Hours  T(C): 36.4 (24 Aug 2022 00:00), Max: 36.6 (23 Aug 2022 16:04)  T(F): 97.5 (24 Aug 2022 00:00), Max: 97.9 (23 Aug 2022 16:04)  HR: 58 (24 Aug 2022 05:10) (58 - 71)  BP: 93/54 (24 Aug 2022 05:10) (93/54 - 121/65)  BP(mean): --  RR: 18 (24 Aug 2022 05:10) (18 - 18)  SpO2: --                                             --------------------------------------------------------------  LABS:                        15.0   7.52  )-----------( 212      ( 23 Aug 2022 06:31 )             44.6     08-23    142  |  106  |  27<H>  ----------------------------<  113<H>  4.3   |  25  |  1.1    Ca    8.6      23 Aug 2022 06:31    TPro  6.0  /  Alb  4.0  /  TBili  0.7  /  DBili  x   /  AST  20  /  ALT  21  /  AlkPhos  52  08-23                  CARDIAC MARKERS ( 22 Aug 2022 18:16 )  x     / x     / 98 U/L / x     / x                                                    --------------------------------------------------------------    PHYSICAL EXAM:  GENERAL: Awake, alert, oriented to person, place, time, situation. Well-nourished, laying in bed appearing in no acute distress  HEENT: No FNDs, atraumatic, normocephalic  LUNGS: Clear to auscultation bilaterally  HEART: S1/S2. No heaves or thrills  ABD: Soft, non-tender, non-distended.  EXT/NEURO: Vertebral tenderness with muscle spasm over most of the lumbar spine.  5/5 strength in all extremity joints. Off and on paresthesias of LE  SKIN: No edema      PLAN:  51 year old man with PMHx of HTN, HLD, DMII, hiatal hernia, herniated discs, CAD on medical tx , chronic low back pain is presenting with 1 day history of severe low back pain and weakness in both of his LE.    #Radicular Pain  -pt states progressive lumbago + leg pain/numbness since 8/20 with fall on ground for hours 8/21  - Patient with low back and neurologic sequale, r/o nerve impingement  -CK 90, 98  -XR lumbar- mild spondylosis  -MR thoracic/lumbosacral- degen  + disc buldge L5/S1 with mod R NFS, L5 on S1 4mm retrolisthesis  - Pain killers- flexeril, celecoxib  -NSG- pain control, PT, attending to follow  -Neuro: b/l hip xray  - f/u PT    #HTN:  - On losartan, diltiazem    #DMII  -A1c 6.1%  - Lantus/Lispro  - SS  - f/u A1c give UA>1000 gluc    #CAD  - on Aspirin    Activity: as tolerated, PT  Diet: DASH/carbs cons  DVT prophylaxis: lovenox 40 mg  GI prophylaxis: pantoprazole  Dispo: floor    #Progress Note Handoff  Pending (specify): PT, NSG, PM, Neuro  Family discussion:   Disposition:    BELIA CH 51y Male  MRN#: [id number]   CODE STATUS:________    Hospital Day: 2d    Pt is currently admitted with the primary diagnosis of radiculopathy    Overnight events   -No major overnight events                                          ----------------------------------------------------------  OBJECTIVE  PAST MEDICAL & SURGICAL HISTORY  Essential hypertension    Diabetes    Hypercholesteremia    CAD (coronary artery disease)    H/O percutaneous left heart catheterization                                              -----------------------------------------------------------  ALLERGIES:  Allergy Status Unknown                                            ------------------------------------------------------------    HOME MEDICATIONS  Home Medications:  Aspirin Low Dose 81 mg oral delayed release tablet: 1 tab(s) orally once a day (22 Aug 2022 05:27)  atorvastatin 40 mg oral tablet: 1 tab(s) orally once a day (22 Aug 2022 05:27)  Cartia  mg/24 hours oral capsule, extended release: 1 cap(s) orally once a day (22 Aug 2022 05:27)  empagliflozin 25 mg oral tablet: 1 tab(s) orally once a day (in the morning) (22 Aug 2022 05:27)  losartan 25 mg oral tablet: 1 tab(s) orally once a day (22 Aug 2022 05:27)  pantoprazole 40 mg oral delayed release tablet: 1 tab(s) orally once a day (22 Aug 2022 05:27)  pioglitazone 45 mg oral tablet: 1 tab(s) orally once a day (22 Aug 2022 05:27)  ranolazine 500 mg oral granule, extended release: 1 tab(s) orally once a day (22 Aug 2022 05:27)  tiZANidine 4 mg oral tablet: 2 tab(s) orally every 8 hours, As Needed (22 Aug 2022 05:27)  Tylenol 325 mg oral tablet: 2 tab(s) orally every 4 hours (22 Aug 2022 05:27)                           MEDICATIONS:  STANDING MEDICATIONS  aspirin enteric coated 81 milliGRAM(s) Oral daily  atorvastatin 40 milliGRAM(s) Oral at bedtime  celecoxib 200 milliGRAM(s) Oral two times a day  dextrose 5%. 1000 milliLiter(s) IV Continuous <Continuous>  dextrose 5%. 1000 milliLiter(s) IV Continuous <Continuous>  dextrose 50% Injectable 25 Gram(s) IV Push once  dextrose 50% Injectable 12.5 Gram(s) IV Push once  dextrose 50% Injectable 25 Gram(s) IV Push once  diltiazem    milliGRAM(s) Oral daily  enoxaparin Injectable 40 milliGRAM(s) SubCutaneous every 24 hours  glucagon  Injectable 1 milliGRAM(s) IntraMuscular once  insulin glargine Injectable (LANTUS) 6 Unit(s) SubCutaneous at bedtime  insulin lispro (ADMELOG) corrective regimen sliding scale   SubCutaneous three times a day before meals  insulin lispro Injectable (ADMELOG) 3 Unit(s) SubCutaneous three times a day before meals  losartan 25 milliGRAM(s) Oral daily  pantoprazole    Tablet 40 milliGRAM(s) Oral before breakfast  ranolazine 500 milliGRAM(s) Oral two times a day    PRN MEDICATIONS  acetaminophen     Tablet .. 650 milliGRAM(s) Oral every 6 hours PRN  cyclobenzaprine 10 milliGRAM(s) Oral three times a day PRN  dextrose Oral Gel 15 Gram(s) Oral once PRN  melatonin 3 milliGRAM(s) Oral at bedtime PRN                                            ------------------------------------------------------------  VITAL SIGNS: Last 24 Hours  T(C): 36.4 (24 Aug 2022 00:00), Max: 36.6 (23 Aug 2022 16:04)  T(F): 97.5 (24 Aug 2022 00:00), Max: 97.9 (23 Aug 2022 16:04)  HR: 58 (24 Aug 2022 05:10) (58 - 71)  BP: 93/54 (24 Aug 2022 05:10) (93/54 - 121/65)  BP(mean): --  RR: 18 (24 Aug 2022 05:10) (18 - 18)  SpO2: --                                             --------------------------------------------------------------  LABS:                        15.0   7.52  )-----------( 212      ( 23 Aug 2022 06:31 )             44.6     08-23    142  |  106  |  27<H>  ----------------------------<  113<H>  4.3   |  25  |  1.1    Ca    8.6      23 Aug 2022 06:31    TPro  6.0  /  Alb  4.0  /  TBili  0.7  /  DBili  x   /  AST  20  /  ALT  21  /  AlkPhos  52  08-23                  CARDIAC MARKERS ( 22 Aug 2022 18:16 )  x     / x     / 98 U/L / x     / x                                                    --------------------------------------------------------------    PHYSICAL EXAM:  GENERAL: Awake, alert, oriented to person, place, time, situation. Well-nourished, laying in bed appearing in no acute distress  HEENT: No FNDs, atraumatic, normocephalic  LUNGS: Clear to auscultation bilaterally  HEART: S1/S2. No heaves or thrills  ABD: Soft, non-tender, non-distended.  EXT/NEURO: Vertebral tenderness with muscle spasm over most of the lumbar spine.  5/5 strength in all extremity joints. Off and on paresthesias of LE  SKIN: No edema      PLAN:  51 year old man with PMHx of HTN, HLD, DMII, hiatal hernia, herniated discs, CAD on medical tx , chronic low back pain is presenting with 1 day history of severe low back pain and weakness in both of his LE.    #Radicular Pain  -pt states progressive lumbago + leg pain/numbness since 8/20 with fall on ground for hours 8/21  - Patient with low back and neurologic sequale, r/o nerve impingement  -CK 90, 98  -XR lumbar- mild spondylosis  -MR thoracic/lumbosacral- degen  + disc buldge L5/S1 with mod R NFS, L5 on S1 4mm retrolisthesis  - Pain killers- flexeril, celecoxib  -NSG- pain control, PT, attending to follow  -Neuro: b/l hip xray  - f/u PT  -f/u hip XR    #HTN:  - On losartan, diltiazem    #DMII  -A1c 6.1%, UA>1000 gluc  - Lantus/Lispro  - SS    #CAD  - on Aspirin    Activity: as tolerated, PT  Diet: DASH/carbs cons  DVT prophylaxis: lovenox 40 mg  GI prophylaxis: pantoprazole  Dispo: floor    #Progress Note Handoff  Pending (specify): PT, NSG, PM, Neuro  Family discussion:   Disposition:    BELIA CH 51y Male  MRN#: [id number]   CODE STATUS:________    Hospital Day: 2d    Pt is currently admitted with the primary diagnosis of radiculopathy    Overnight events   -No major overnight events                                          ----------------------------------------------------------  OBJECTIVE  PAST MEDICAL & SURGICAL HISTORY  Essential hypertension    Diabetes    Hypercholesteremia    CAD (coronary artery disease)    H/O percutaneous left heart catheterization                                              -----------------------------------------------------------  ALLERGIES:  Allergy Status Unknown                                            ------------------------------------------------------------    HOME MEDICATIONS  Home Medications:  Aspirin Low Dose 81 mg oral delayed release tablet: 1 tab(s) orally once a day (22 Aug 2022 05:27)  atorvastatin 40 mg oral tablet: 1 tab(s) orally once a day (22 Aug 2022 05:27)  Cartia  mg/24 hours oral capsule, extended release: 1 cap(s) orally once a day (22 Aug 2022 05:27)  empagliflozin 25 mg oral tablet: 1 tab(s) orally once a day (in the morning) (22 Aug 2022 05:27)  losartan 25 mg oral tablet: 1 tab(s) orally once a day (22 Aug 2022 05:27)  pantoprazole 40 mg oral delayed release tablet: 1 tab(s) orally once a day (22 Aug 2022 05:27)  pioglitazone 45 mg oral tablet: 1 tab(s) orally once a day (22 Aug 2022 05:27)  ranolazine 500 mg oral granule, extended release: 1 tab(s) orally once a day (22 Aug 2022 05:27)  tiZANidine 4 mg oral tablet: 2 tab(s) orally every 8 hours, As Needed (22 Aug 2022 05:27)  Tylenol 325 mg oral tablet: 2 tab(s) orally every 4 hours (22 Aug 2022 05:27)                           MEDICATIONS:  STANDING MEDICATIONS  aspirin enteric coated 81 milliGRAM(s) Oral daily  atorvastatin 40 milliGRAM(s) Oral at bedtime  celecoxib 200 milliGRAM(s) Oral two times a day  dextrose 5%. 1000 milliLiter(s) IV Continuous <Continuous>  dextrose 5%. 1000 milliLiter(s) IV Continuous <Continuous>  dextrose 50% Injectable 25 Gram(s) IV Push once  dextrose 50% Injectable 12.5 Gram(s) IV Push once  dextrose 50% Injectable 25 Gram(s) IV Push once  diltiazem    milliGRAM(s) Oral daily  enoxaparin Injectable 40 milliGRAM(s) SubCutaneous every 24 hours  glucagon  Injectable 1 milliGRAM(s) IntraMuscular once  insulin glargine Injectable (LANTUS) 6 Unit(s) SubCutaneous at bedtime  insulin lispro (ADMELOG) corrective regimen sliding scale   SubCutaneous three times a day before meals  insulin lispro Injectable (ADMELOG) 3 Unit(s) SubCutaneous three times a day before meals  losartan 25 milliGRAM(s) Oral daily  pantoprazole    Tablet 40 milliGRAM(s) Oral before breakfast  ranolazine 500 milliGRAM(s) Oral two times a day    PRN MEDICATIONS  acetaminophen     Tablet .. 650 milliGRAM(s) Oral every 6 hours PRN  cyclobenzaprine 10 milliGRAM(s) Oral three times a day PRN  dextrose Oral Gel 15 Gram(s) Oral once PRN  melatonin 3 milliGRAM(s) Oral at bedtime PRN                                            ------------------------------------------------------------  VITAL SIGNS: Last 24 Hours  T(C): 36.4 (24 Aug 2022 00:00), Max: 36.6 (23 Aug 2022 16:04)  T(F): 97.5 (24 Aug 2022 00:00), Max: 97.9 (23 Aug 2022 16:04)  HR: 58 (24 Aug 2022 05:10) (58 - 71)  BP: 93/54 (24 Aug 2022 05:10) (93/54 - 121/65)  BP(mean): --  RR: 18 (24 Aug 2022 05:10) (18 - 18)  SpO2: --                                             --------------------------------------------------------------  LABS:                        15.0   7.52  )-----------( 212      ( 23 Aug 2022 06:31 )             44.6     08-23    142  |  106  |  27<H>  ----------------------------<  113<H>  4.3   |  25  |  1.1    Ca    8.6      23 Aug 2022 06:31    TPro  6.0  /  Alb  4.0  /  TBili  0.7  /  DBili  x   /  AST  20  /  ALT  21  /  AlkPhos  52  08-23                  CARDIAC MARKERS ( 22 Aug 2022 18:16 )  x     / x     / 98 U/L / x     / x                                                    --------------------------------------------------------------    PHYSICAL EXAM:  GENERAL: Awake, alert, oriented to person, place, time, situation. Well-nourished, laying in bed appearing in no acute distress  HEENT: No FNDs, atraumatic, normocephalic  LUNGS: Clear to auscultation bilaterally  HEART: S1/S2. No heaves or thrills  ABD: Soft, non-tender, non-distended.  EXT/NEURO: Vertebral tenderness with muscle spasm over most of the lumbar spine.  5/5 strength in all extremity joints. Off and on paresthesias of LE  SKIN: No edema      PLAN:  51 year old man with PMHx of HTN, HLD, DMII, hiatal hernia, herniated discs, CAD on medical tx , chronic low back pain is presenting with 1 day history of severe low back pain and weakness in both of his LE.    #Radicular Pain  -pt states progressive lumbago + leg pain/numbness since 8/20 with fall on ground for hours 8/21  - Patient with low back and neurologic sequale, r/o nerve impingement  -CK 90, 98  -XR lumbar- mild spondylosis  -MR thoracic/lumbosacral- degen  + disc buldge L5/S1 with mod R NFS, L5 on S1 4mm retrolisthesis  - Pain killers- flexeril, celecoxib  -NSG- pain control, PT, attending to follow  -Neuro: b/l hip xray  - f/u PT  -f/u hip XR    #HTN:  - On losartan, diltiazem    #DMII  -A1c 6.1%, UA>1000 gluc (on SGLT-2 at home)  - Lantus/Lispro  - SS    #CAD  - on Aspirin    Activity: as tolerated, PT  Diet: DASH/carbs cons  DVT prophylaxis: lovenox 40 mg  GI prophylaxis: pantoprazole  Dispo: floor    #Progress Note Handoff  Pending (specify): PT, NSG, PM, Neuro  Family discussion:   Disposition:

## 2022-08-24 NOTE — DISCHARGE NOTE PROVIDER - CARE PROVIDERS DIRECT ADDRESSES
,DirectAddress_Unknown,[email or web address]

## 2022-08-24 NOTE — DISCHARGE NOTE PROVIDER - HOSPITAL COURSE
HPI:  51 year old man with PMHx of HTN, HLD, DMII, hiatal hernia, herniated discs, CAD on medical tx , chronic low back pain is presenting with 1 day history of severe low back pain and weakness in both of his LE. Patient reports that he lifted his grandkids today and felt that the back pain radiated to his LE and they became weak and ultimately he couldn't bare his weight. Patient denies any fever or chills. No weight loss or change in his apetite. No urinary incontinence or retention. No stool incontinence and no numbness in his pelvic area. He reports tingling and numbness in his LE that has now resolved.    Hospital Course:  Pt admitted following pain in back after lifting grandchild with subsequent fall without being able to get up due to weakness/pain. Pt evaluated by neurology/NSG/pain management. Pt had MR thoracic and lumbosacral spine with findings of:  THORACIC SPINE:  Unremarkable contrast-enhanced examination of the thoracic spine.    LUMBAR SPINE:  Mild multilevel degenerative changes and disc bulging most significant at   the L5-S1 level resulting in moderate right neuroforaminal narrowing.    4 mm retrolisthesis of L5 on S1.     No abnormal soft tissue or osseous enhancement.    Pt reported paresthesias and pain reduced with celecoxib and flexeril. Pt stable for discharge with PT HPI:  51 year old man with PMHx of HTN, HLD, DMII, hiatal hernia, herniated discs, CAD on medical tx , chronic low back pain is presenting with 1 day history of severe low back pain and weakness in both of his LE. Patient reports that he lifted his grandkids today and felt that the back pain radiated to his LE and they became weak and ultimately he couldn't bare his weight. Patient denies any fever or chills. No weight loss or change in his apetite. No urinary incontinence or retention. No stool incontinence and no numbness in his pelvic area. He reports tingling and numbness in his LE that has now resolved.    Hospital Course:  Pt admitted following pain in back after lifting grandchild with subsequent fall without being able to get up due to weakness/pain. Pt evaluated by neurology/NSG/pain management. Pt had MR thoracic and lumbosacral spine with findings of:  THORACIC SPINE:  Unremarkable contrast-enhanced examination of the thoracic spine.    LUMBAR SPINE:  Mild multilevel degenerative changes and disc bulging most significant at   the L5-S1 level resulting in moderate right neuroforaminal narrowing.    4 mm retrolisthesis of L5 on S1.     No abnormal soft tissue or osseous enhancement.    Pt reported paresthesias and pain reduced with celecoxib and flexeril. Pt stable for discharge with PT    Attending Attestation:  Patient was seen & examined independently. At least 10 systems were reviewed in ROS. All systems reviewed  are within normal limits. Latest vital signs and labs were reviewed today. Case was discussed with house staff in morning rounds for assessment and plan.  Patient is medically stable for discharge . About 36 mins spent on discharge disposition. HPI:  51 year old man with PMHx of HTN, HLD, DMII, hiatal hernia, herniated discs, CAD on medical tx , chronic low back pain is presenting with 1 day history of severe low back pain and weakness in both of his LE. Patient reports that he lifted his grandkids today and felt that the back pain radiated to his LE and they became weak and ultimately he couldn't bare his weight. Patient denies any fever or chills. No weight loss or change in his apetite. No urinary incontinence or retention. No stool incontinence and no numbness in his pelvic area. He reports tingling and numbness in his LE that has now resolved.    Hospital Course:  Pt admitted following pain in back after lifting grandchild with subsequent fall without being able to get up due to weakness/pain. Pt evaluated by neurology/NSG/pain management. Pt had MR thoracic and lumbosacral spine with findings of:  THORACIC SPINE:  Unremarkable contrast-enhanced examination of the thoracic spine.    LUMBAR SPINE:  Mild multilevel degenerative changes and disc bulging most significant at   the L5-S1 level resulting in moderate right neuroforaminal narrowing.    4 mm retrolisthesis of L5 on S1.     No abnormal soft tissue or osseous enhancement.    Pt reported paresthesias and pain reduced with celecoxib and flexeril. Pt stable for discharge and walked without assistance with PT    Attending Attestation:  Patient was seen & examined independently. At least 10 systems were reviewed in ROS. All systems reviewed  are within normal limits. Latest vital signs and labs were reviewed today. Case was discussed with house staff in morning rounds for assessment and plan.  Patient is medically stable for discharge . About 36 mins spent on discharge disposition.

## 2022-08-24 NOTE — DISCHARGE NOTE PROVIDER - CARE PROVIDER_API CALL
Jw Garcia)  PhysicalRehab Medicine  [address] [place] [address]  Phone: [phone]  Fax: [phone]  Follow Up Time: 1-3 days    Jonathan Sheehan)  EEGEpilepsy; Neurology  [address] [place] [address], [address]  [place] [address]  Phone: [phone]  Fax: [phone]  Follow Up Time: 1 week

## 2022-08-24 NOTE — DISCHARGE NOTE PROVIDER - NSDCCPTREATMENT_GEN_ALL_CORE_FT
PRINCIPAL PROCEDURE  Procedure: MR lumbar spine w con  Findings and Treatment: IMPRESSION:  THORACIC SPINE:  Unremarkable contrast-enhanced examination of the thoracic spine.  LUMBAR SPINE:  Mild multilevel degenerative changes and disc bulging most significant at   the L5-S1 level resulting in moderate right neuroforaminal narrowing.  4 mm retrolisthesis of L5 on S1.   No abnormal soft tissue or osseous enhancement.  --- End of Report ---

## 2022-08-24 NOTE — CONSULT NOTE ADULT - SUBJECTIVE AND OBJECTIVE BOX
HPI:  51 year old man with PMHx of HTN, HLD, DMII, hiatal hernia, herniated discs, CAD on medical tx , chronic low back pain is presenting with 1 day history of severe low back pain and weakness in both of his LE. Patient reports that he lifted his grandkids Friday and felt that the back pain radiated to his LE and they became weak and ultimately he couldn't bare his weight crawled on the floor and was brought in by EMS. Patient denies any fever or chills. No weight loss or change in his apetite. No urinary incontinence or retention. No stool incontinence and no numbness in his pelvic area. He reports tingling and numbness in his LE that has now resolved. (22 Aug 2022 05:29). Patient has had back pain most of his life from being [employer] and  for 30 yrs.    PAST MEDICAL & SURGICAL HISTORY:  Essential hypertension    Diabetes    Hypercholesteremia    CAD (coronary artery disease)    H/O percutaneous left heart catheterization    Home Medications:  Aspirin Low Dose 81 mg oral delayed release tablet: 1 tab(s) orally once a day (22 Aug 2022 05:27)  atorvastatin 40 mg oral tablet: 1 tab(s) orally once a day (22 Aug 2022 05:27)  Cartia  mg/24 hours oral capsule, extended release: 1 cap(s) orally once a day (22 Aug 2022 05:27)  empagliflozin 25 mg oral tablet: 1 tab(s) orally once a day (in the morning) (22 Aug 2022 05:27)  losartan 25 mg oral tablet: 1 tab(s) orally once a day (22 Aug 2022 05:27)  pantoprazole 40 mg oral delayed release tablet: 1 tab(s) orally once a day (22 Aug 2022 05:27)  pioglitazone 45 mg oral tablet: 1 tab(s) orally once a day (22 Aug 2022 05:27)  ranolazine 500 mg oral granule, extended release: 1 tab(s) orally once a day (22 Aug 2022 05:27)  tiZANidine 4 mg oral tablet: 2 tab(s) orally every 8 hours, As Needed (22 Aug 2022 05:27)  Tylenol 325 mg oral tablet: 2 tab(s) orally every 4 hours (22 Aug 2022 05:27)    Allergies    Allergy Status Unknown    Intolerances    MEDICATIONS  (STANDING):  aspirin enteric coated 81 milliGRAM(s) Oral daily  atorvastatin 40 milliGRAM(s) Oral at bedtime  celecoxib 200 milliGRAM(s) Oral two times a day  dextrose 5%. 1000 milliLiter(s) (100 mL/Hr) IV Continuous <Continuous>  dextrose 5%. 1000 milliLiter(s) (50 mL/Hr) IV Continuous <Continuous>  dextrose 50% Injectable 25 Gram(s) IV Push once  dextrose 50% Injectable 12.5 Gram(s) IV Push once  dextrose 50% Injectable 25 Gram(s) IV Push once  diltiazem    milliGRAM(s) Oral daily  enoxaparin Injectable 40 milliGRAM(s) SubCutaneous every 24 hours  glucagon  Injectable 1 milliGRAM(s) IntraMuscular once  insulin glargine Injectable (LANTUS) 6 Unit(s) SubCutaneous at bedtime  insulin lispro (ADMELOG) corrective regimen sliding scale   SubCutaneous three times a day before meals  insulin lispro Injectable (ADMELOG) 3 Unit(s) SubCutaneous three times a day before meals  losartan 25 milliGRAM(s) Oral daily  pantoprazole    Tablet 40 milliGRAM(s) Oral before breakfast  ranolazine 500 milliGRAM(s) Oral two times a day    MEDICATIONS  (PRN):  acetaminophen     Tablet .. 650 milliGRAM(s) Oral every 6 hours PRN Temp greater or equal to 38C (100.4F), Mild Pain (1 - 3)  cyclobenzaprine 10 milliGRAM(s) Oral three times a day PRN Muscle Spasm  dextrose Oral Gel 15 Gram(s) Oral once PRN Blood Glucose LESS THAN 70 milliGRAM(s)/deciliter  melatonin 3 milliGRAM(s) Oral at bedtime PRN Insomnia    ICU Vital Signs Last 24 Hrs  T(C): 36.6 (23 Aug 2022 16:04), Max: 36.8 (23 Aug 2022 00:00)  T(F): 97.9 (23 Aug 2022 16:04), Max: 98.2 (23 Aug 2022 00:00)  HR: 71 (23 Aug 2022 16:04) (61 - 71)  BP: 104/71 (23 Aug 2022 16:04) (102/63 - 104/71)  BP(mean): --  ABP: --  ABP(mean): --  RR: 18 (23 Aug 2022 16:04) (18 - 18)  SpO2: --    I&O's Detail    CBC Full  -  ( 23 Aug 2022 06:31 )  WBC Count : 7.52 K/uL  RBC Count : 5.00 M/uL  Hemoglobin : 15.0 g/dL  Hematocrit : 44.6 %  Platelet Count - Automated : 212 K/uL  Mean Cell Volume : 89.2 fL  Mean Cell Hemoglobin : 30.0 pg  Mean Cell Hemoglobin Concentration : 33.6 g/dL  Auto Neutrophil # : 3.47 K/uL  Auto Lymphocyte # : 2.94 K/uL  Auto Monocyte # : 0.82 K/uL  Auto Eosinophil # : 0.21 K/uL  Auto Basophil # : 0.05 K/uL  Auto Neutrophil % : 46.1 %  Auto Lymphocyte % : 39.1 %  Auto Monocyte % : 10.9 %  Auto Eosinophil % : 2.8 %  Auto Basophil % : 0.7 %        142  |  106  |  27<H>  ----------------------------<  113<H>  4.3   |  25  |  1.1    Ca    8.6      23 Aug 2022 06:31    TPro  6.0  /  Alb  4.0  /  TBili  0.7  /  DBili  x   /  AST  20  /  ALT  21  /  AlkPhos  52      CARDIAC MARKERS ( 22 Aug 2022 18:16 )  x     / x     / 98 U/L / x     / x      CARDIAC MARKERS ( 21 Aug 2022 21:42 )  x     / x     / 90 U/L / x     / x        Urinalysis Basic - ( 21 Aug 2022 23:43 )    Color: Yellow / Appearance: Clear / S.043 / pH: x  Gluc: x / Ketone: Small  / Bili: Negative / Urobili: <2 mg/dL   Blood: x / Protein: Trace / Nitrite: Negative   Leuk Esterase: Negative / RBC: x / WBC x   Sq Epi: x / Non Sq Epi: x / Bacteria: x      AAOX3. Verbal function intact  tongue midline, facial motions symmetric  PERRLA, EOMI  Pronator Drift: neg , no foot drop, no urine or GI complaints  Motor: MAEx4, 5/5 power in b/l UE and   mild weakness LLE  Sensation: intact to touch in all extremities    Imaging: < from: MR Lumbar Spine w/ IV Cont (22 @ 09:25) >    IMPRESSION:    THORACIC SPINE:  Unremarkable contrast-enhanced examination of the thoracic spine.    LUMBAR SPINE:  Mild multilevel degenerative changes and disc bulging most significant at   the L5-S1 level resulting in moderate right neuroforaminal narrowing.    4 mm retrolisthesis of L5 on S1.     No abnormal soft tissue or osseous enhancement.      < end of copied text >    Assessment/Plan- degenerative changes as above, recommend conservative management with pain management, PT, muscle relaxants, attending to see patient in the am.
Neurology Consult    Patient is a 51y old  Male who presents with a chief complaint of back pain and LE weakness (23 Aug 2022 19:13)      HPI:  51 year old man with PMHx of HTN, HLD, DMII, hiatal hernia, herniated discs, CAD on medical tx , chronic low back pain is presenting with 1 day history of severe low back pain and weakness in both of his LE. Patient reports that he lifted his grandkids today and felt that the back pain radiated to his LE and they became weak and ultimately he couldn't bare his weight. Patient denies any fever or chills. No weight loss or change in his apetite. No urinary incontinence or retention. No stool incontinence and no numbness in his pelvic area. He reports tingling and numbness in his LE that has now resolved. (22 Aug 2022 05:29)      PAST MEDICAL & SURGICAL HISTORY:  Essential hypertension      Diabetes      Hypercholesteremia      CAD (coronary artery disease)      H/O percutaneous left heart catheterization          FAMILY HISTORY:  No pertinent family history in first degree relatives        Social History: (-) x 3    Allergies    Allergy Status Unknown    Intolerances        MEDICATIONS  (STANDING):  aspirin enteric coated 81 milliGRAM(s) Oral daily  atorvastatin 40 milliGRAM(s) Oral at bedtime  celecoxib 200 milliGRAM(s) Oral two times a day  dextrose 5%. 1000 milliLiter(s) (100 mL/Hr) IV Continuous <Continuous>  dextrose 5%. 1000 milliLiter(s) (50 mL/Hr) IV Continuous <Continuous>  dextrose 50% Injectable 25 Gram(s) IV Push once  dextrose 50% Injectable 12.5 Gram(s) IV Push once  dextrose 50% Injectable 25 Gram(s) IV Push once  diltiazem    milliGRAM(s) Oral daily  enoxaparin Injectable 40 milliGRAM(s) SubCutaneous every 24 hours  glucagon  Injectable 1 milliGRAM(s) IntraMuscular once  insulin glargine Injectable (LANTUS) 6 Unit(s) SubCutaneous at bedtime  insulin lispro (ADMELOG) corrective regimen sliding scale   SubCutaneous three times a day before meals  insulin lispro Injectable (ADMELOG) 3 Unit(s) SubCutaneous three times a day before meals  losartan 25 milliGRAM(s) Oral daily  pantoprazole    Tablet 40 milliGRAM(s) Oral before breakfast  ranolazine 500 milliGRAM(s) Oral two times a day    MEDICATIONS  (PRN):  acetaminophen     Tablet .. 650 milliGRAM(s) Oral every 6 hours PRN Temp greater or equal to 38C (100.4F), Mild Pain (1 - 3)  cyclobenzaprine 10 milliGRAM(s) Oral three times a day PRN Muscle Spasm  dextrose Oral Gel 15 Gram(s) Oral once PRN Blood Glucose LESS THAN 70 milliGRAM(s)/deciliter  melatonin 3 milliGRAM(s) Oral at bedtime PRN Insomnia          Vital Signs Last 24 Hrs  T(C): 36.6 (23 Aug 2022 16:04), Max: 36.8 (23 Aug 2022 00:00)  T(F): 97.9 (23 Aug 2022 16:04), Max: 98.2 (23 Aug 2022 00:00)  HR: 71 (23 Aug 2022 16:04) (61 - 71)  BP: 104/71 (23 Aug 2022 16:04) (102/63 - 104/71)  BP(mean): --  RR: 18 (23 Aug 2022 16:04) (18 - 18)  SpO2: --        Examination:  General:  Appearance is consistent with chronologic age.  No abnormal facies.  Gross skin survey within normal limits.    Cognitive/Language:  The patient is oriented to person, place, time and date.  Recent and remote memory intact.  Fund of knowledge is intact and normal.  Language with normal repetition, comprehension and naming.  Nondysarthric.    Eyes: intact  VFF.  EOMI w/o nystagmus  Face:  Facial sensation normal no facial asymmetry.    Ears/Nose/Throat:  Hearing grossly intact b/l.  Palate elevates midline.  Tongue midline.   Motor examination:   Normal tone, bulk and range of motion.  No tenderness, twitching, tremors or involuntary movements.  Formal Muscle Strength Testing: (MRC grade R/L) 5/5 UE; 5/5 LE.  No observable drift.  Reflexes:   2+ b/l , bicepsbrachioradialis, 1+ patella and Achilles.    Sensory examination:   Intact to light touch , vibration and temperature all throughout  Cerebellum:   FTN intact   straight leg test- b/l legs endorses pain     Labs:   CBC Full  -  ( 23 Aug 2022 06:31 )  WBC Count : 7.52 K/uL  RBC Count : 5.00 M/uL  Hemoglobin : 15.0 g/dL  Hematocrit : 44.6 %  Platelet Count - Automated : 212 K/uL  Mean Cell Volume : 89.2 fL  Mean Cell Hemoglobin : 30.0 pg  Mean Cell Hemoglobin Concentration : 33.6 g/dL  Auto Neutrophil # : 3.47 K/uL  Auto Lymphocyte # : 2.94 K/uL  Auto Monocyte # : 0.82 K/uL  Auto Eosinophil # : 0.21 K/uL  Auto Basophil # : 0.05 K/uL  Auto Neutrophil % : 46.1 %  Auto Lymphocyte % : 39.1 %  Auto Monocyte % : 10.9 %  Auto Eosinophil % : 2.8 %  Auto Basophil % : 0.7 %        142  |  106  |  27<H>  ----------------------------<  113<H>  4.3   |  25  |  1.1    Ca    8.6      23 Aug 2022 06:31    TPro  6.0  /  Alb  4.0  /  TBili  0.7  /  DBili  x   /  AST  20  /  ALT  21  /  AlkPhos  52      LIVER FUNCTIONS - ( 23 Aug 2022 06:31 )  Alb: 4.0 g/dL / Pro: 6.0 g/dL / ALK PHOS: 52 U/L / ALT: 21 U/L / AST: 20 U/L / GGT: x             Urinalysis Basic - ( 21 Aug 2022 23:43 )    Color: Yellow / Appearance: Clear / S.043 / pH: x  Gluc: x / Ketone: Small  / Bili: Negative / Urobili: <2 mg/dL   Blood: x / Protein: Trace / Nitrite: Negative   Leuk Esterase: Negative / RBC: x / WBC x   Sq Epi: x / Non Sq Epi: x / Bacteria: x          Neuroimaging:  NCHCT:     22 @ 23:38      < from: MR Lumbar Spine w/ IV Cont (22 @ 09:25) >    IMPRESSION:    THORACIC SPINE:  Unremarkable contrast-enhanced examination of the thoracic spine.    LUMBAR SPINE:  Mild multilevel degenerative changes and disc bulging most significant at   the L5-S1 level resulting in moderate right neuroforaminal narrowing.    4 mm retrolisthesis of L5 on S1.     No abnormal soft tissue or osseous enhancement.    --- End of Report ---    < end of copied text >  < from: Xray Lumbar Spine AP + Lateral (22 @ 11:29) >  IMPRESSION:  1.  No acute osseous abnormality identified.  2.Mild lumbar spondylosis.    --- End of Report ---    < end of copied text >  
Pain Medicine Consult Note    History of Present Illness  Patient is a 52 y/o man with history of DM, HTN, HL, CAD, and GERD who was admitted on 8/22/2022 with acute on chronic low back pain. The patient states that he has had a longstanding history of chronic low back pain for the past 30+ years, which has been treated with epidural injections, physical therapy, and treatment from a chiropractor. His last epidural steroid injection was approximately 1.5 years ago. The patient states that he felt a twinge of pain in his low back on 8/19/2022 when he was playing with his grandchildren. He states that the pain got progressively worse over the next few days. At this point, the patient endorses having an aching pain in the low back that radiates to the posterior aspect of both thighs and legs. He notes intermittent numbness but no focal weakness. No bowel or bladder incontinence or saddle anesthesia. The patient states that pain is worse with standing and walking. He takes tizanidine and acetaminophen prn at home. No history of chronic opioid use.     Current Inpatient Medication Regimen:  acetaminophen     Tablet .. 650 milliGRAM(s) Oral every 6 hours PRN  aspirin enteric coated 81 milliGRAM(s) Oral daily  atorvastatin 40 milliGRAM(s) Oral at bedtime  celecoxib 200 milliGRAM(s) Oral two times a day  cyclobenzaprine 10 milliGRAM(s) Oral three times a day PRN  dextrose 5%. 1000 milliLiter(s) IV Continuous <Continuous>  dextrose 5%. 1000 milliLiter(s) IV Continuous <Continuous>  dextrose 50% Injectable 25 Gram(s) IV Push once  dextrose 50% Injectable 12.5 Gram(s) IV Push once  dextrose 50% Injectable 25 Gram(s) IV Push once  dextrose Oral Gel 15 Gram(s) Oral once PRN  diltiazem    milliGRAM(s) Oral daily  enoxaparin Injectable 40 milliGRAM(s) SubCutaneous every 24 hours  glucagon  Injectable 1 milliGRAM(s) IntraMuscular once  insulin glargine Injectable (LANTUS) 6 Unit(s) SubCutaneous at bedtime  insulin lispro (ADMELOG) corrective regimen sliding scale   SubCutaneous three times a day before meals  insulin lispro Injectable (ADMELOG) 3 Unit(s) SubCutaneous three times a day before meals  losartan 25 milliGRAM(s) Oral daily  magnesium sulfate  IVPB 2 Gram(s) IV Intermittent once  melatonin 3 milliGRAM(s) Oral at bedtime PRN  pantoprazole    Tablet 40 milliGRAM(s) Oral before breakfast  ranolazine 500 milliGRAM(s) Oral two times a day      Home Analgesic Regimen:  tizanidine prn  acetaminophen prn    Allergies:  Allergy Status Unknown      Past Medical History:  Essential hypertension  Diabetes  Hypercholesteremia  CAD (coronary artery disease)  GERD    Past Surgical History:  Percutaneous left heart catheterization  Bilateral knee arthroscopy    Family History:  CAD (mother)  Skin cancer (father)    Social History:  Tobacco - occasional cigars  EtOH - denies  Drugs - denies      Review of Systems:  General: no fevers or chills  Eyes: no diplopia or blurred vision  ENT: no rhinorrhea  CV: no chest pain  Resp: no cough or dyspnea  GI: no abdominal pain, constipation, or diarrhea  : no urinary incontinence or dysuria  Neuro: +intermittent numbness, no focal weakness  Psych: no depression or anxiety    Physical Exam:  T(C): 35.8 (08-24-22 @ 08:00), Max: 36.6 (08-23-22 @ 16:04)  HR: 57 (08-24-22 @ 08:00) (57 - 71)  BP: 110/69 (08-24-22 @ 08:00) (93/54 - 121/65)  RR: 18 (08-24-22 @ 08:00) (18 - 18)  SpO2: 99% (08-24-22 @ 08:00) (99% - 99%)  Gen: NAD  Eyes: no scleral icterus  Head: Normocephalic / Atraumatic  CV: no JVD  Lungs: nonlabored breathing  Abdomen: nondistended, soft  : no gillespie catheter in place  Back: +tenderness to palpation over the bilateral low lumbar facet region  Neuro: AOx3, Cranial nerves intact, +5/5 strength with bilateral hip flexion, leg flexion/extension, foot plantar and dorsiflexion  Extremities: full ROM in upper/lower extremities  Psych: normal affect      Labs:  CBC  5.54 K/uL [4.80 - 10.80] > 15.5 g/dL [14.0 - 18.0] / 44.4 % [42.0 - 52.0] < 193 K/uL [130 - 400]      BMP  138 mmol/L [135 - 146] | 104 mmol/L [98 - 110] | 23 mg/dL<H> [10 - 20]  4.3 mmol/L [3.5 - 5.0] | 28 mmol/L [17 - 32] | 0.9 mg/dL [0.7 - 1.5]    118 mg/dL<H> [70 - 99]        Imaging Studies:  MRI Thoracic/Lumbar Spine (8/23/2022)  FINDINGS:      THORACIC SPINE:  VERTEBRAL BODIES/ALIGNMENT: There is no abnormal marrow signal or   enhancement. No osseous lesions are identified..    SPINAL CORD: There is no abnormal spinal cord signal or enhancement..    SPINAL CANAL:  No intradural or extradural defects are seen.    There is a 7 mm perineural root sleeve cyst involving the right T5 C6   neural foramen.    There is a perineural root sleeve cyst involving the right T6-T7 neural   foramen measuring 1 cm.    There is a perineural root sleeve cyst involving the right T8-T9 neural   foramen.    INTERVERTEBRAL DISCS:  The disc spaces are well-maintained. There is no   abnormal signal or enhancement within the spaces..    MISCELLANEOUS:  None.      LUMBAR SPINE:  VERTEBRAL BODIES/ALIGNMENT:  Vertebral body marrow signal is   unremarkable. There is no abnormal osseous enhancement or osseous lesions   identified..    There is retrolisthesis of L5 on S1 approximately 4 mm. There are no   subluxations.    CONUS/CAUDA EQUINA: Unremarkable terminating at the inferior endplate of   T12. No abnormal signal or enhancement involving the cauda equina.    INTERVERTEBRAL DISCS: The disc spaces are well-maintained. There is no   abnormal signal or enhancement within the spaces.    L1-L2:  Normal.  L2-L3:  Normal.  L3-L4:  There is trace disc bulging indenting the ventral thecal   foraminal narrowing..  L4-L5:  There is trace disc bulging indenting the ventral thecal sac   without spinal canal or neuroforaminal narrowing..  L5-S1:  There is disc bulging as well as bilateral facet arthrosis   resulting in moderate right neuroforaminal narrowing..    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      IMPRESSION:    THORACIC SPINE:  Unremarkable contrast-enhanced examination of the thoracic spine.    LUMBAR SPINE:  Mild multilevel degenerative changes and disc bulging most significant at   the L5-S1 level resulting in moderate right neuroforaminal narrowing.    4 mm retrolisthesis of L5 on S1.     No abnormal soft tissue or osseous enhancement.      Opioid Risk Assessment Tool                                                                         Female       Male  Family History  Alcohol                                                              1                3  Illegal drugs                                                       2                3  Rx drugs                                                            4                4    Personal History   Alcohol                                                              3                3  Illegal drugs                                                       4                4  Rx drugs                                                            5                5    Age between 16—45 years                                1                1  History of preadolescent sexual abuse               3                0    Psychological disease  ADD, OCD, bipolar, schizophrenia                      2                2  Depression                                                       1                1    Total Score                                                      __              0    0 - 3 = low risk for future opioid abuse  4 - 7 = moderate risk for future opioid abuse  8+ = high risk for future opioid abuse

## 2022-08-24 NOTE — DISCHARGE NOTE PROVIDER - NSDCMRMEDTOKEN_GEN_ALL_CORE_FT
Aspirin Low Dose 81 mg oral delayed release tablet: 1 tab(s) orally once a day  atorvastatin 40 mg oral tablet: 1 tab(s) orally once a day  Cartia  mg/24 hours oral capsule, extended release: 1 cap(s) orally once a day  celecoxib 200 mg oral capsule: 1 cap(s) orally 2 times a day  cyclobenzaprine 10 mg oral tablet: 1 tab(s) orally 3 times a day, As needed, Muscle Spasm  empagliflozin 25 mg oral tablet: 1 tab(s) orally once a day (in the morning)  losartan 25 mg oral tablet: 1 tab(s) orally once a day  pantoprazole 40 mg oral delayed release tablet: 1 tab(s) orally once a day  pioglitazone 45 mg oral tablet: 1 tab(s) orally once a day  ranolazine 500 mg oral granule, extended release: 1 tab(s) orally once a day  tiZANidine 4 mg oral tablet: 2 tab(s) orally every 8 hours, As Needed  Tylenol 325 mg oral tablet: 2 tab(s) orally every 4 hours   acetaminophen 500 mg oral tablet: 2 tab(s) orally every 8 hours  Aspirin Low Dose 81 mg oral delayed release tablet: 1 tab(s) orally once a day  atorvastatin 40 mg oral tablet: 1 tab(s) orally once a day  Cartia  mg/24 hours oral capsule, extended release: 1 cap(s) orally once a day  celecoxib 200 mg oral capsule: 1 cap(s) orally 2 times a day  cyclobenzaprine 10 mg oral tablet: 1 tab(s) orally 3 times a day  empagliflozin 25 mg oral tablet: 1 tab(s) orally once a day (in the morning)  losartan 25 mg oral tablet: 1 tab(s) orally once a day  pantoprazole 40 mg oral delayed release tablet: 1 tab(s) orally once a day  pioglitazone 45 mg oral tablet: 1 tab(s) orally once a day  ranolazine 500 mg oral granule, extended release: 1 tab(s) orally once a day  tiZANidine 4 mg oral tablet: 2 tab(s) orally every 8 hours, As Needed

## 2022-08-29 DIAGNOSIS — E78.5 HYPERLIPIDEMIA, UNSPECIFIED: ICD-10-CM

## 2022-08-29 DIAGNOSIS — Z79.82 LONG TERM (CURRENT) USE OF ASPIRIN: ICD-10-CM

## 2022-08-29 DIAGNOSIS — E11.9 TYPE 2 DIABETES MELLITUS WITHOUT COMPLICATIONS: ICD-10-CM

## 2022-08-29 DIAGNOSIS — Z79.4 LONG TERM (CURRENT) USE OF INSULIN: ICD-10-CM

## 2022-08-29 DIAGNOSIS — K21.9 GASTRO-ESOPHAGEAL REFLUX DISEASE WITHOUT ESOPHAGITIS: ICD-10-CM

## 2022-08-29 DIAGNOSIS — Z20.822 CONTACT WITH AND (SUSPECTED) EXPOSURE TO COVID-19: ICD-10-CM

## 2022-08-29 DIAGNOSIS — M51.36 OTHER INTERVERTEBRAL DISC DEGENERATION, LUMBAR REGION: ICD-10-CM

## 2022-08-29 DIAGNOSIS — Z72.0 TOBACCO USE: ICD-10-CM

## 2022-08-29 DIAGNOSIS — I25.10 ATHEROSCLEROTIC HEART DISEASE OF NATIVE CORONARY ARTERY WITHOUT ANGINA PECTORIS: ICD-10-CM

## 2022-08-29 DIAGNOSIS — K44.9 DIAPHRAGMATIC HERNIA WITHOUT OBSTRUCTION OR GANGRENE: ICD-10-CM

## 2022-08-29 DIAGNOSIS — I10 ESSENTIAL (PRIMARY) HYPERTENSION: ICD-10-CM

## 2022-09-07 ENCOUNTER — APPOINTMENT (OUTPATIENT)
Dept: PAIN MANAGEMENT | Facility: CLINIC | Age: 52
End: 2022-09-07

## 2022-09-07 VITALS
HEIGHT: 72 IN | HEART RATE: 108 BPM | SYSTOLIC BLOOD PRESSURE: 120 MMHG | WEIGHT: 200 LBS | DIASTOLIC BLOOD PRESSURE: 78 MMHG | BODY MASS INDEX: 27.09 KG/M2

## 2022-09-07 VITALS — HEIGHT: 72 IN

## 2022-09-07 DIAGNOSIS — M51.26 OTHER INTERVERTEBRAL DISC DEGENERATION, LUMBAR REGION: ICD-10-CM

## 2022-09-07 DIAGNOSIS — M51.36 OTHER INTERVERTEBRAL DISC DEGENERATION, LUMBAR REGION: ICD-10-CM

## 2022-09-07 PROCEDURE — 99214 OFFICE O/P EST MOD 30 MIN: CPT | Mod: 25

## 2022-09-07 PROCEDURE — 96136 PSYCL/NRPSYC TST PHY/QHP 1ST: CPT | Mod: 59

## 2022-09-07 NOTE — ASSESSMENT
[FreeTextEntry1] : 51 year old male presenting with ongoing lumbar radiculopathy. Patient is presenting with radicular pain with impairment in ADLs and functionality.  The pain has not responded to conservative care, including medications, stretching, as well as active modalities, such as physical therapy.  Imaging studies as well as physical exam findings corroborate the symptomatology and radicular pain.  We will proceed with an epidural at this point. We will proceed with a bilateral L5-S1 TFESI with sedation. In addition, I will also have him receive a Lumbar LSO brace. Follow up in 6 weeks will be made for reassessment.\par \par Bilateral L5-S1 transforaminal epidural steroid injection with sedation.\par \par Patient had a MRI that shows a radicular component along with pain referred into the lower extremity. Patient has trialed rehab (Home exercise, physical therapy or chiropractic care) and medications I will schedule a L5-S1 SNRI.\par \par Risk, benefits, pros and cons of procedure were explained to the patient using models and diagrams and their questions were answered. \par \par \par The patient has severe anxiety of procedures that necessitates monitored anesthesia care (MAC). The procedure performed will be close to major nerves, arteries, and spinal cord and/or joint structures. Due to the proximity of these structures, we need the patient to be still during the procedure.  With the help of MAC, this will be safely achieved and decrease the risk of any complications.\par \par Patient has pain in spinal movement along with weakness in extension and flexion. I will put in for a lumbar brace with lateral supports to be worn no more than 4 hours a day and 2 hours in a row to decrease facet and disc load, to decrease pain, increase activity, increase function, to reduce pain by restricting mobility of the trunk, to facilitate healing of the spine and related Soft tissues and to support weak spinal muscles used to help the patient with rehab and home exercise program stressing walking.  Other exercises discussed include swimming, elliptical , recumbent bike, Ricky chi and Yoga. Use things that heat like hot shower or icy heat before rehab and exercising and at the beginning of the day, and ice (ice in a bag never directly on the skin) after activity and at the end of the day.\par \par No medications were given at todays visit.\par \par We encourage a home exercise program, stressing walking and strengthening of the core. We have recommended exercises such as the modified plank, Alon exercise, elliptical , recumbent bike, as well as shoulder griddle strengthening. We discussed recreational activities such as swimming, Ricky Chi and yoga. Use things that heat like hot shower or icy heat before rehab and exercising and at the beginning of the day, and ice (ice in a bag never directly on the skin) after activity and at the end of the day.\par \par Neuropsychological SOAPP and PCS testing was performed as an evaluation of cognition, mood, personality, behavior to assess likelihood of addiction, misuse, other aberrant medication-related behaviors, and different thoughts and feelings that may be associated with pain. The total time spent rendering and interpreting the service was approximately 20 minutes. Results will be implemented in the appropriate care of the patient\par \par A total of 33 minutes was spent on this visit, reviewing previous notes/consultations/imaging, counseling the patient on appropriate biomechanics impacting the pain, ordering tests if needed, refilling meds if needed, and documenting the findings in the note.\par \par Entered by Lindsey Stack, acting as scribe for Dr. Fuentes.\par  \par The documentation recorded by the scribe, in my presence, accurately reflects the service I personally performed, and the decisions made by me with my edits as appropriate.\par  \par Best Regards, \par John Fuentes MD \par Board Certified, Anesthesiology \par Board Certified, Pain Medicine\par \par

## 2022-09-07 NOTE — DATA REVIEWED
[FreeTextEntry1] : MRI LUMBAR 2018: IMPRESSION: Interval change is seen when comparison is made to the prior study of 8/17/17 in that the extruded disc fragment that was observed compressing the left S1 root sleeve at L5-S1 is no longer evident. Currently there is a small subligamentous disc herniation to the left of midline which is without thecal sac compression or S1 root displacement. A diffuse annular bulge with a central annular fissure at L4-5 is unchanged when compared to the prior study.\par \par NEW MRI of the lumbar and thoracic spine showed IMPRESSION:\par \par THORACIC SPINE:\par Unremarkable contrast-enhanced examination of the thoracic spine.\par \par LUMBAR SPINE:\par Mild multilevel degenerative changes and disc bulging most significant at \par the L5-S1 level resulting in moderate right neuroforaminal narrowing.\par \par 4 mm retrolisthesis of L5 on S1.\par \par  No abnormal soft tissue or osseous enhancement.\par \par SOAPP: Scored a 0 , low risk.\par  \par [place] REGISTRY: Reviewed .  \par  \par UDS: No data obtained today. \par  \par Medications that trigger a UDS: Benzodiazepines (Ativan, Xanax, Valium) etc, Barbiturates, Narcotics (Avinza, Butrans, hydrocodone, Codeine, Yelena, Methadone, Morphine, MS Contin, Opana, oxycodone, Oxycontin, Suboxone etc), Pregabalin (Lyrica), Tramadol (Ultacet, Utram etc), Tapentadol, (Nucynta) and Elist Drugs (cocaine, THC, Etc.)\par  \par Risk factors: Bipolar Illness, positive for any an illicit drugs, history of any ETOH and drug abuse, any signs of diversion, Sharing Meds, selling meds. Non consistent [place] drug reporting and above 120meq of morphine\par  \par Low risk: Patient has combination of a low risk SOAP and no risk factors. UDS would be repeated randomly every quarter\par

## 2022-09-07 NOTE — HISTORY OF PRESENT ILLNESS
[FreeTextEntry1] : HISTORY OF PRESENT ILLNESS: Mr. Snow is a 51 year old male complaining of lower back pain. The patient is status post a lumbar epidural steroid injection with minimal relief. His chief complaint is mainly axial back pain associated with stiffness, range of motion difficulties and worse with any extension. He states this pain is severe, constant and aching with burning. He states this pain does radiate but only into his buttock area. He denies any true radicular symptomology. The patient has had this pain for 4 years. Patient describes the pain as moderate or severe. During the last month the pain has been constant with symptoms worsening in no typical pattern. Pain is increased with lying down, standing, sitting, walking, exercise, coughing sneezing. Bowel or bladder habits have not changed.\par \par TODAY: Since last visit, he states last week he fell and injured his lower back. He went to the ER and had a updated MRI of the lumbar and thoracic spine. He continues today with ongoing severe lower back pain. He states the pain is in the lumbar spine with radiation into the bilateral lower extremities with associated numbness, tingling and spasms. He currently rates his pain at a 9/10 on the pain scale. \par

## 2022-09-07 NOTE — PHYSICAL EXAM
[de-identified] : Constitutional\par GENERAL APPEARANCE OF PATIENT IS WELL DEVELOPED, WELL NOURISHED, BODY HABITUS NORMAL, WELL GROOMED, NO DEFORMITIES NOTED. \par \par Head\par -          Atraumatic and Normocephalic \par \par Eyes, Nose, and Throat:\par -          External inspection of ears and nose are normal overall without scars, lesions, or masses noted\par -          Assessment of hearing is normal\par \par Neck\par -          Examination of neck shows no masses, overall appearance is normal, neck is symmetric, tracheal position is midline, no crepitus is noted\par -          Examination of thyroid shows no enlargement, tenderness or masses\par \par Respiratory\par -          Assessment of respiratory effort shows no intercostal retractions, no use of accessory muscles, unlabored breathing, and normal diaphragmatic movement.\par \par Cardiovascular\par -          Examination of extremities show no edema or varicosities\par \par Musculoskeletal\par -           Inspection and palpation of digits and nails shows no clubbing, cyanosis, nodules, drainage, fluctuance, petechiae\par \par 1)         Spine- Palpation of the thoracic/lumbar spine is as follows: bilateral lumbar paraspinal tenderness. Range of motion of the thoracic and lumbar spine is as follows: Diminished range of motion in all planes. pain at extremes of flexion and pain at extremes extension. Gait and function is as follows: non-antalgic gait and patient ambulates without assistive device.\par \par 2)         Neck- inspection and palpation shows no misalignment, asymmetry, crepitation, defects, tenderness, masses, effusions. ROM is normal without crepitation or contracture. No instability or subluxation or laxity is noted. No abnormal movements.\par \par 3)         RUE- inspection and palpation shows no misalignment, asymmetry, crepitation, defects, tenderness, masses, effusions. ROM is normal without crepitation or contracture. No instability or subluxation or laxity is noted. No abnormal movements.\par \par 4)         LUE-inspection and palpation shows no misalignment, asymmetry, crepitation, defects, tenderness, masses, effusions. ROM is normal without crepitation or contracture. No instability or subluxation or laxity is noted. No abnormal movements.\par \par 5)         RLE- inspection and palpation shows no misalignment, asymmetry, crepitation, defects, tenderness, masses, effusions. ROM is normal without crepitation or contracture. No instability or subluxation or laxity is noted. No abnormal movements.\par \par 6)         LLE-inspection and palpation shows no misalignment, asymmetry, crepitation, defects, tenderness, masses, effusions. ROM is normal without crepitation or contracture. No instability or subluxation or laxity is noted. No abnormal movements.\par \par Skin\par -           Inspection of skin and subcutaneous tissue shows no rashes, lesions or ulcers\par -           Palpation of skin and subcutaneous tissue shows no rashes, no indurations, subcutaneous nodules or tightening.\par \par  Abdomen\par -           Soft; Non-tender\par \par Neurologic\par -           CN 2-12 are grossly intact\par -           No sensory or motor deficits in the upper and lower extremities\par -           Adequate strength in upper and lower extremities\par \par Psychiatric\par -           Patients judgment and insight are intact\par -           Oriented to time, place and person\par -           Recent and remote memory intact.\par \par

## 2022-09-13 ENCOUNTER — APPOINTMENT (OUTPATIENT)
Dept: PAIN MANAGEMENT | Facility: CLINIC | Age: 52
End: 2022-09-13

## 2022-09-13 PROCEDURE — 00630 ANES PX LUMBAR REGION NOS: CPT | Mod: QZ,P3

## 2022-09-13 PROCEDURE — 93040 RHYTHM ECG WITH REPORT: CPT | Mod: 59

## 2022-09-13 PROCEDURE — 94761 N-INVAS EAR/PLS OXIMETRY MLT: CPT

## 2022-09-13 PROCEDURE — 93770 DETERMINATION VENOUS PRESS: CPT

## 2022-09-13 PROCEDURE — 64483 NJX AA&/STRD TFRM EPI L/S 1: CPT | Mod: 50,59

## 2022-09-13 PROCEDURE — 72100 X-RAY EXAM L-S SPINE 2/3 VWS: CPT

## 2022-09-13 NOTE — PROCEDURE
[FreeTextEntry1] : SELECTIVE TRANSFORAMINAL LUMBAR L5-S1 EPIDURAL NERVE ROOT INJECTION UNDER FLUOROSCOPY [FreeTextEntry3] : Date:  2022\par \par Patient: Melchor Snow\par \par :  1970\par \par \par \par \par Preoperative Diagnosis: Lumbar Radiculopathy\par \par \par \par Procedure:\par 1. Selective Bilateral L5-S1 Transforaminal Lumbar Epidural Nerve Root Injection under Fluoroscopy\par 2. Epidurography\par 3. Fluoroscopic guidance and localization of needle\par \par \par \par Physician: John Fuentes M.D.\par Anesthesiologist/CRNA: Mr. Tamayo\par Anesthesia: MAC/ IV sedation \par Medical Necessity:  Failure of conservative management.\par \par \par \par Consent:  Though unusual, the possible complications including infection, bleeding, nerve damage, hospital admission, stroke, pneumothorax, death or failure of the procedure are theoretically possible. The patient was educated about the of the procedure and alternative therapies. All questions were answered and the patient freely gave consent to proceed.\par \par \par \par Indication for Fluoroscopy:  This procedure requires the precise placement of the spinal needle into the epidural space.  It is the only way to accurately and safely perform the injection.\par \par \par \par PROCEDURE NOTE:\par After obtaining written consent, the patient was then positioned on the fluoroscopy table in the prone position with a pillow beneath the pelvis to reduce lumbar lordosis. The lumbar area was prepped with betadine solution and draped in the usual manner. A time out was performed. The fluoroscope was used to identify the /L5 vertebral body on the AP projection. It was then rotated into an oblique projection until the superior articulating process of the S1 (inferior) vertebra is projected beneath the 6 o'clock position of the L5 (superior) vertebrae. The 22 gauge 3-1/2 inch needle was inserted in the skin at a point overlying the superior articulating process of the inferior vertebra and aimed for the 6 o'clock position of the superior vertebrae's pedicle.  After the needle contacted bone, a lateral projection was obtained to insure that the needle tip was in proximity with the vertebral body. Paresthesias were not noted.  One ml of Omnipaque 240 was injected and a neurogram was obtained. Following demonstration of the neurogram, 1 ml of Preservative free normal saline and 1 ml of dexamethasone (10mg) was injected. The small volume and relatively high concentration was chosen to preserve selectivity and diagnostic value of the injection. There was no CSF nor heme identified. The contralateral side was injected in identical fashion.\par \par \par \par Epidurogram: The nerve root was observed in its outline on the neurogram. Distal and proximal spread was noted.\par \par Findings: Lumbar Spine AP and oblique views with x-ray degenerative changes noted.\par \par Complications: none. \par \par Disposition: I have examined the patient and there are no new physical findings since original presentation. The patient was discharged home with a . The discharge instruction sheet was given to the patient. Motor function was intact.\par \par Comment: 1st TFESI today, depending on effectiveness would schedule 2nd TFESI in 1-2 weeks vs caudal epidural steroid vs follow up in office. Call if any problems\par \par  \par \par This document was signed by:\par \par John Fuentes MD \par Board Certified, Anesthesiology \par Board Certified, Pain Medicine\par \par

## 2022-09-19 ENCOUNTER — APPOINTMENT (OUTPATIENT)
Dept: PAIN MANAGEMENT | Facility: CLINIC | Age: 52
End: 2022-09-19

## 2022-09-19 PROCEDURE — 93040 RHYTHM ECG WITH REPORT: CPT | Mod: 59

## 2022-09-19 PROCEDURE — 93770 DETERMINATION VENOUS PRESS: CPT | Mod: 59

## 2022-09-19 PROCEDURE — 72100 X-RAY EXAM L-S SPINE 2/3 VWS: CPT

## 2022-09-19 PROCEDURE — 99152Z: CUSTOM

## 2022-09-19 PROCEDURE — 64483 NJX AA&/STRD TFRM EPI L/S 1: CPT | Mod: 50,59

## 2022-09-19 NOTE — PROCEDURE
[FreeTextEntry3] : Preoperative Diagnosis: Lumbar Radiculopathy\par \par Procedure:\par \par 1. Selective Bilateral L5-S1 Transforaminal Lumbar Epidural Nerve Root Injection under Fluoroscopy\par \par 2. Epidurography\par \par 3. Fluoroscopic guidance and localization of needle\par \par Physician: John Fuentes M.D.\par \par  \par \par Anesthesia: Local with IV Sedation\par \par Medical Necessity: Failure of conservative management.\par \par Consent: Though unusual, the possible complications including infection, bleeding, nerve damage, hospital admission, stroke, pneumothorax, death or failure of the procedure are theoretically possible. The patient was educated about the of the procedure and alternative therapies. All questions were answered and the patient freely gave consent to proceed.\par \par Indication for Fluoroscopy: This procedure requires the precise placement of the spinal needle into the epidural space. It is the only way to accurately and safely perform the injection.\par \par PROCEDURE NOTE:\par \par After obtaining written consent, the patient was then positioned on the fluoroscopy table in the prone position with a pillow beneath the pelvis to reduce lumbar lordosis. The lumbar area was prepped with betadine solution and draped in the usual manner. A time out was performed. The fluoroscope was used to identify the L3///L4///L5 vertebral body on the AP projection. It was then rotated into an oblique projection until the superior articulating process of the S1 (inferior) vertebra is projected beneath the 6 o'clock position of the L5 (superior) vertebrae. The 22 gauge 3-1/2 inch needle was inserted in the skin at a point overlying the superior articulating process of the inferior vertebra and aimed for the 6 o'clock position of the superior vertebrae's pedicle. After the needle contacted bone, a lateral projection was obtained to insure that the needle tip was in proximity with the vertebral body. Paresthesias were not noted. One ml of Omnipaque 240 was injected and a neurogram was obtained. Following demonstration of the neurogram, 1 ml of Preservative free normal saline and 1 ml of dexamethasone (10mg) was injected. The small volume and relatively high concentration was chosen to preserve selectivity and diagnostic value of the injection. There was no CSF nor heme identified. The contralateral side was injected in identical fashion.\par \par Epidurogram: The nerve root was observed in its outline on the neurogram. Distal and proximal spread was noted.\par \par Findings: Lumbar Spine AP and oblique views with x-ray degenerative changes noted.\par \par Complications: none.\par \par Disposition: I have examined the patient and there are no new physical findings since original presentation. The patient was discharged home with a . The discharge instruction sheet was given to the patient. Motor function was intact.\par \par Comment: 2nd  TFESI today, depending on effectiveness would schedule 3rd TFESI in 1-2 weeks vs caudal epidural steroid vs follow up in office. Call if any problems\par \par John Fuentes MD \par Board Certified, Anesthesiology \par Board Certified, Pain Medicine

## 2022-09-20 ENCOUNTER — APPOINTMENT (OUTPATIENT)
Dept: PAIN MANAGEMENT | Facility: CLINIC | Age: 52
End: 2022-09-20

## 2022-09-27 ENCOUNTER — APPOINTMENT (OUTPATIENT)
Dept: PAIN MANAGEMENT | Facility: CLINIC | Age: 52
End: 2022-09-27
Payer: COMMERCIAL

## 2022-09-27 ENCOUNTER — APPOINTMENT (OUTPATIENT)
Dept: PAIN MANAGEMENT | Facility: CLINIC | Age: 52
End: 2022-09-27

## 2022-09-27 PROCEDURE — 93770 DETERMINATION VENOUS PRESS: CPT

## 2022-09-27 PROCEDURE — 72100 X-RAY EXAM L-S SPINE 2/3 VWS: CPT

## 2022-09-27 PROCEDURE — 64483 NJX AA&/STRD TFRM EPI L/S 1: CPT | Mod: 50

## 2022-09-27 PROCEDURE — 00630 ANES PX LUMBAR REGION NOS: CPT | Mod: QZ

## 2022-09-27 PROCEDURE — 93040 RHYTHM ECG WITH REPORT: CPT | Mod: 59

## 2022-09-27 PROCEDURE — 94761 N-INVAS EAR/PLS OXIMETRY MLT: CPT | Mod: 59

## 2022-09-27 NOTE — PROCEDURE
[FreeTextEntry1] : SELECTIVE TRANSFORAMINAL LUMBAR L5-S1 EPIDURAL NERVE ROOT INJECTION UNDER FLUOROSCOPY [FreeTextEntry3] : Date:  2022\par \par Patient: Melchor Snow\par \par :  1970\par \par \par \par Preoperative Diagnosis: Lumbar Radiculopathy\par \par \par \par Procedure:\par 1. Selective Bilateral L5-S1 Transforaminal Lumbar Epidural Nerve Root Injection under Fluoroscopy\par 2. Epidurography\par 3. Fluoroscopic guidance and localization of needle\par \par \par \par Physician: John Fuentes M.D.\par Anesthesiologist/CRNA: Ms. Roche \par Anesthesia: MAC,see nurses notes, Versed-4, Fentanyl-100\par Medical Necessity:  Failure of conservative management.\par \par \par \par Consent:  Though unusual, the possible complications including infection, bleeding, nerve damage, hospital admission, stroke, pneumothorax, death or failure of the procedure are theoretically possible. The patient was educated about the of the procedure and alternative therapies. All questions were answered and the patient freely gave consent to proceed.\par \par \par \par Indication for Fluoroscopy:  This procedure requires the precise placement of the spinal needle into the epidural space.  It is the only way to accurately and safely perform the injection.\par \par \par \par PROCEDURE NOTE:\par After obtaining written consent, the patient was then positioned on the fluoroscopy table in the prone position with a pillow beneath the pelvis to reduce lumbar lordosis. The lumbar area was prepped with betadine solution and draped in the usual manner. A time out was performed. The fluoroscope was used to identify the /L5 vertebral body on the AP projection. It was then rotated into an oblique projection until the superior articulating process of the S1 (inferior) vertebra is projected beneath the 6 o'clock position of the L5 (superior) vertebrae. The 22 gauge 3-1/2 inch needle was inserted in the skin at a point overlying the superior articulating process of the inferior vertebra and aimed for the 6 o'clock position of the superior vertebrae's pedicle.  After the needle contacted bone, a lateral projection was obtained to insure that the needle tip was in proximity with the vertebral body. Paresthesias were not noted.  One ml of Omnipaque 240 was injected and a neurogram was obtained. Following demonstration of the neurogram, 1 ml of Preservative free normal saline and 1 ml of dexamethasone (10mg) was injected. The small volume and relatively high concentration was chosen to preserve selectivity and diagnostic value of the injection. There was no CSF nor heme identified. The contralateral side was injected in identical fashion.\par \par \par \par Epidurogram: The nerve root was observed in its outline on the neurogram. Distal and proximal spread was noted.\par \par Findings: Lumbar Spine AP and oblique views with x-ray degenerative changes noted.\par \par Complications: none. \par \par Disposition: I have examined the patient and there are no new physical findings since original presentation. The patient was discharged home with a . The discharge instruction sheet was given to the patient. Motor function was intact.\par \par Comment: 3rdTFESI today, depending on effectiveness follow up in office. Call if any problems\par \par  \par \par This document was signed by:\par \par John Fuentes MD \par Board Certified, Anesthesiology \par Board Certified, Pain Medicine\par \par

## 2022-10-12 ENCOUNTER — APPOINTMENT (OUTPATIENT)
Dept: NEUROLOGY | Facility: CLINIC | Age: 52
End: 2022-10-12

## 2022-10-12 VITALS — HEART RATE: 98 BPM | SYSTOLIC BLOOD PRESSURE: 119 MMHG | DIASTOLIC BLOOD PRESSURE: 78 MMHG

## 2022-12-07 ENCOUNTER — APPOINTMENT (OUTPATIENT)
Dept: PAIN MANAGEMENT | Facility: CLINIC | Age: 52
End: 2022-12-07

## 2023-01-04 ENCOUNTER — APPOINTMENT (OUTPATIENT)
Dept: PAIN MANAGEMENT | Facility: CLINIC | Age: 53
End: 2023-01-04

## 2023-01-18 ENCOUNTER — APPOINTMENT (OUTPATIENT)
Dept: ORTHOPEDIC SURGERY | Facility: CLINIC | Age: 53
End: 2023-01-18

## 2023-06-15 ENCOUNTER — APPOINTMENT (OUTPATIENT)
Dept: ORTHOPEDIC SURGERY | Facility: CLINIC | Age: 53
End: 2023-06-15
Payer: COMMERCIAL

## 2023-07-17 ENCOUNTER — APPOINTMENT (OUTPATIENT)
Dept: OTOLARYNGOLOGY | Facility: CLINIC | Age: 53
End: 2023-07-17

## 2023-07-18 ENCOUNTER — APPOINTMENT (OUTPATIENT)
Dept: PAIN MANAGEMENT | Facility: CLINIC | Age: 53
End: 2023-07-18

## 2023-12-05 ENCOUNTER — APPOINTMENT (OUTPATIENT)
Dept: PAIN MANAGEMENT | Facility: CLINIC | Age: 53
End: 2023-12-05

## 2024-01-22 ENCOUNTER — APPOINTMENT (OUTPATIENT)
Dept: ORTHOPEDIC SURGERY | Facility: CLINIC | Age: 54
End: 2024-01-22
Payer: COMMERCIAL

## 2024-01-22 PROCEDURE — 99213 OFFICE O/P EST LOW 20 MIN: CPT

## 2024-01-23 NOTE — REASON FOR VISIT
[FreeTextEntry2] : patient is here for neck and back pain R>L and right shoulder pain still some knee pain LOV 7/20/21 Still seeing neurology and pain management Dr. Garcia has good days and bad days did have a few severe episodes of back pain just lifting small items is hemoglobin A1c most recent 5.9 did bring recent x-rays of right shoulder and MRIs Using Tylenol and occasional tizanidine pain in the right side of his neck and into his right shoulder difficulty sleeping did have a few injections by PM Still reports poor endurance 10 to 15 minutes standing sitting 15 minutes walking 1 block

## 2024-01-23 NOTE — IMAGING
[de-identified] : Pleasant easy to examine in mild distress Medium build weight is stable 200 pounds  neck exam mild spasm Mild limits in motion MRI cervical spine 9/7/2023 herniated disks at C5-6 and C6-7 with several bulging disks both discs are herniated to the right right shoulder Clinically located mild limits in elevation and rotation little weakness in external rotation against resistance biceps intact positive impingement positive Seo negative [place] and [place]  MRI right shoulder 9/7/2023 diffuse tendinitis AC joint changes  X-ray right shoulder 4/24/2023 negative study  Lumbar spine good posterior diffuse spasm no scoliosis some mild limits in flexion extension tight dorsolumbar fascia and hamstrings hip motion is full normal gait negative straight leg bilaterally xrays both hips 8/24/22    no sigificant arthritis Knees normal stance mild limited in flexion in both knees no swelling no Baker's cyst tenderness on the medial joint line

## 2024-01-23 NOTE — ASSESSMENT
[FreeTextEntry1] : Orthopedically stable I saw no intervention necessary to the shoulder or knees continue to do some exercise as tolerated heat is good he should follow-up with the neurologist and pain management doctors if any other tests are done to provide me copies. It remains my opinion that the gentleman is totally disabled and unable to work I will see him in 3 months reports no further contact with the general surgeon for his hernia

## 2024-05-06 ENCOUNTER — APPOINTMENT (OUTPATIENT)
Dept: ORTHOPEDIC SURGERY | Facility: CLINIC | Age: 54
End: 2024-05-06

## 2024-05-07 ENCOUNTER — APPOINTMENT (OUTPATIENT)
Dept: ORTHOPEDIC SURGERY | Facility: CLINIC | Age: 54
End: 2024-05-07
Payer: COMMERCIAL

## 2024-05-07 DIAGNOSIS — M77.8 OTHER ENTHESOPATHIES, NOT ELSEWHERE CLASSIFIED: ICD-10-CM

## 2024-05-07 DIAGNOSIS — M51.9 UNSPECIFIED THORACIC, THORACOLUMBAR AND LUMBOSACRAL INTERVERTEBRAL DISC DISORDER: ICD-10-CM

## 2024-05-07 DIAGNOSIS — M17.0 BILATERAL PRIMARY OSTEOARTHRITIS OF KNEE: ICD-10-CM

## 2024-05-07 DIAGNOSIS — M54.16 RADICULOPATHY, LUMBAR REGION: ICD-10-CM

## 2024-05-07 DIAGNOSIS — M50.90 CERVICAL DISC DISORDER, UNSPECIFIED, UNSPECIFIED CERVICAL REGION: ICD-10-CM

## 2024-05-07 PROCEDURE — 99213 OFFICE O/P EST LOW 20 MIN: CPT

## 2024-05-07 NOTE — ASSESSMENT
[FreeTextEntry1] : Orthopedically stable I saw no intervention necessary to the right shoulder or knees continue to do some exercise as tolerated heat is good he should follow-up with the neurologist and pain management doctors  if any other tests are done to provide me copies. It remains my opinion that the gentleman is totally disabled and unable to work I will see him in 3 months

## 2024-05-07 NOTE — REASON FOR VISIT
[FreeTextEntry2] : patient is here for neck and back pain R>L and right shoulder pain still some knee pain Still taking tizanidine sugar stable hemoglobin A1c 6.0 recently saw Dr. Garcia has not seen neurology Dr. Camacho recently still difficulty sleeping back pain sometimes makes it feel like he is frozen no new tests seeing Dr. Carrasco tomorrow to discuss another lumbar injection

## 2024-05-07 NOTE — IMAGING
[de-identified] : Pleasant easy to examine in mild distress Medium build weight is stable 200 pounds  neck exam mild spasm Mild limits in motion MRI cervical spine 9/7/2023 herniated disks at C5-6 and C6-7 with several bulging disks both discs are herniated to the right right shoulder Clinically located mild limits in elevation and rotation little weakness in external rotation against resistance biceps intact positive impingement positive Seo negative [employer] [employer]  MRI right shoulder 9/7/2023 diffuse tendinitis AC joint changes  X-ray right shoulder 4/24/2023 negative study  Lumbar spine good posterior diffuse spasm no scoliosis some mild limits in flexion extension tight dorsolumbar fascia and hamstrings hip motion is full normal gait negative straight leg bilaterally xrays both hips 8/24/22    no sigificant arthritis Knees normal stance mild limited in flexion in both knees no swelling no Baker's cyst tenderness on the medial joint line

## 2024-07-24 ENCOUNTER — APPOINTMENT (OUTPATIENT)
Dept: PAIN MANAGEMENT | Facility: CLINIC | Age: 54
End: 2024-07-24

## 2024-09-13 ENCOUNTER — APPOINTMENT (OUTPATIENT)
Dept: ORTHOPEDIC SURGERY | Facility: CLINIC | Age: 54
End: 2024-09-13
Payer: COMMERCIAL

## 2024-09-13 DIAGNOSIS — M77.8 OTHER ENTHESOPATHIES, NOT ELSEWHERE CLASSIFIED: ICD-10-CM

## 2024-09-13 DIAGNOSIS — M17.0 BILATERAL PRIMARY OSTEOARTHRITIS OF KNEE: ICD-10-CM

## 2024-09-13 DIAGNOSIS — M54.16 RADICULOPATHY, LUMBAR REGION: ICD-10-CM

## 2024-09-13 DIAGNOSIS — M51.9 UNSPECIFIED THORACIC, THORACOLUMBAR AND LUMBOSACRAL INTERVERTEBRAL DISC DISORDER: ICD-10-CM

## 2024-09-13 DIAGNOSIS — M50.90 CERVICAL DISC DISORDER, UNSPECIFIED, UNSPECIFIED CERVICAL REGION: ICD-10-CM

## 2024-09-13 PROCEDURE — 99213 OFFICE O/P EST LOW 20 MIN: CPT

## 2024-09-13 NOTE — ASSESSMENT
[FreeTextEntry1] : Orthopedically stable I saw no intervention to the right shoulder or knees continue to do some home exercise as tolerated heat is good he should follow-up with the neurologist and pain management   if any other tests are done to provide me copies. It remains my opinion that the gentleman is totally disabled and unable to work I will see him in 3 months

## 2024-09-13 NOTE — IMAGING
[de-identified] : Pleasant easy to examine in mild distress Medium build weight is stable 200 pounds  neck exam mild spasm Mild limits in motion MRI cervical spine 9/7/2023 herniated disks at C5-6 and C6-7 with several bulging disks both discs are herniated to the right right shoulder Clinically located mild limits in elevation and rotation little weakness in external rotation against resistance biceps intact positive impingement positive Seo negative [place] [employer]  MRI right shoulder 9/7/2023 diffuse tendinitis AC joint changes  X-ray right shoulder 4/24/2023 negative study  Lumbar spine good posterior diffuse spasm no scoliosis some mild limits in flexion extension tight dorsolumbar fascia and hamstrings hip motion is full normal gait negative straight leg bilaterally xrays both hips 8/24/22    no sigificant arthritis Knees normal stance mild limited in flexion in both knees no swelling no Baker's cyst tenderness on the medial joint line

## 2024-09-13 NOTE — REASON FOR VISIT
[FreeTextEntry2] : patient is here for neck and back pain R>L and right shoulder pain pain is about the same still seeing pain management and neurology hemoglobin A1c 5.9 [place] structured therapy doing some home therapy and stretching using Tylenol PM and aspirin

## 2024-09-13 NOTE — IMAGING
[de-identified] : Pleasant easy to examine in mild distress Medium build weight is stable 200 pounds  neck exam mild spasm Mild limits in motion MRI cervical spine 9/7/2023 herniated disks at C5-6 and C6-7 with several bulging disks both discs are herniated to the right right shoulder Clinically located mild limits in elevation and rotation little weakness in external rotation against resistance biceps intact positive impingement positive Seo negative [place] [employer]  MRI right shoulder 9/7/2023 diffuse tendinitis AC joint changes  X-ray right shoulder 4/24/2023 negative study  Lumbar spine good posterior diffuse spasm no scoliosis some mild limits in flexion extension tight dorsolumbar fascia and hamstrings hip motion is full normal gait negative straight leg bilaterally xrays both hips 8/24/22    no sigificant arthritis Knees normal stance mild limited in flexion in both knees no swelling no Baker's cyst tenderness on the medial joint line

## 2024-09-25 ENCOUNTER — APPOINTMENT (OUTPATIENT)
Dept: PAIN MANAGEMENT | Facility: CLINIC | Age: 54
End: 2024-09-25

## 2024-10-09 ENCOUNTER — APPOINTMENT (OUTPATIENT)
Dept: PAIN MANAGEMENT | Facility: CLINIC | Age: 54
End: 2024-10-09

## 2025-01-06 ENCOUNTER — APPOINTMENT (OUTPATIENT)
Dept: ORTHOPEDIC SURGERY | Facility: CLINIC | Age: 55
End: 2025-01-06
Payer: COMMERCIAL

## 2025-01-06 DIAGNOSIS — M25.532 PAIN IN RIGHT WRIST: ICD-10-CM

## 2025-01-06 DIAGNOSIS — G89.29 PAIN IN RIGHT WRIST: ICD-10-CM

## 2025-01-06 DIAGNOSIS — M25.531 PAIN IN RIGHT WRIST: ICD-10-CM

## 2025-01-06 DIAGNOSIS — M51.9 UNSPECIFIED THORACIC, THORACOLUMBAR AND LUMBOSACRAL INTERVERTEBRAL DISC DISORDER: ICD-10-CM

## 2025-01-06 DIAGNOSIS — M77.8 OTHER ENTHESOPATHIES, NOT ELSEWHERE CLASSIFIED: ICD-10-CM

## 2025-01-06 DIAGNOSIS — M50.90 CERVICAL DISC DISORDER, UNSPECIFIED, UNSPECIFIED CERVICAL REGION: ICD-10-CM

## 2025-01-06 DIAGNOSIS — M17.0 BILATERAL PRIMARY OSTEOARTHRITIS OF KNEE: ICD-10-CM

## 2025-01-06 PROCEDURE — 99213 OFFICE O/P EST LOW 20 MIN: CPT

## 2025-01-15 ENCOUNTER — APPOINTMENT (OUTPATIENT)
Dept: PAIN MANAGEMENT | Facility: CLINIC | Age: 55
End: 2025-01-15

## 2025-02-04 ENCOUNTER — APPOINTMENT (OUTPATIENT)
Dept: ORTHOPEDIC SURGERY | Facility: CLINIC | Age: 55
End: 2025-02-04

## 2025-05-06 ENCOUNTER — APPOINTMENT (OUTPATIENT)
Dept: ORTHOPEDIC SURGERY | Facility: CLINIC | Age: 55
End: 2025-05-06
Payer: COMMERCIAL

## 2025-05-06 DIAGNOSIS — M54.16 RADICULOPATHY, LUMBAR REGION: ICD-10-CM

## 2025-05-06 DIAGNOSIS — M51.9 UNSPECIFIED THORACIC, THORACOLUMBAR AND LUMBOSACRAL INTERVERTEBRAL DISC DISORDER: ICD-10-CM

## 2025-05-06 DIAGNOSIS — G89.29 PAIN IN RIGHT WRIST: ICD-10-CM

## 2025-05-06 DIAGNOSIS — M25.531 PAIN IN RIGHT WRIST: ICD-10-CM

## 2025-05-06 DIAGNOSIS — M25.532 PAIN IN RIGHT WRIST: ICD-10-CM

## 2025-05-06 DIAGNOSIS — M77.8 OTHER ENTHESOPATHIES, NOT ELSEWHERE CLASSIFIED: ICD-10-CM

## 2025-05-06 DIAGNOSIS — M17.0 BILATERAL PRIMARY OSTEOARTHRITIS OF KNEE: ICD-10-CM

## 2025-05-06 DIAGNOSIS — M50.90 CERVICAL DISC DISORDER, UNSPECIFIED, UNSPECIFIED CERVICAL REGION: ICD-10-CM

## 2025-05-06 PROCEDURE — 73110 X-RAY EXAM OF WRIST: CPT | Mod: 50

## 2025-05-06 PROCEDURE — 99213 OFFICE O/P EST LOW 20 MIN: CPT

## 2025-05-08 ENCOUNTER — APPOINTMENT (OUTPATIENT)
Dept: PAIN MANAGEMENT | Facility: CLINIC | Age: 55
End: 2025-05-08

## 2025-08-05 ENCOUNTER — APPOINTMENT (OUTPATIENT)
Dept: ORTHOPEDIC SURGERY | Facility: CLINIC | Age: 55
End: 2025-08-05
Payer: COMMERCIAL

## 2025-08-05 DIAGNOSIS — M77.8 OTHER ENTHESOPATHIES, NOT ELSEWHERE CLASSIFIED: ICD-10-CM

## 2025-08-05 DIAGNOSIS — M51.9 UNSPECIFIED THORACIC, THORACOLUMBAR AND LUMBOSACRAL INTERVERTEBRAL DISC DISORDER: ICD-10-CM

## 2025-08-05 DIAGNOSIS — M54.16 RADICULOPATHY, LUMBAR REGION: ICD-10-CM

## 2025-08-05 DIAGNOSIS — M25.531 PAIN IN RIGHT WRIST: ICD-10-CM

## 2025-08-05 DIAGNOSIS — G89.29 PAIN IN RIGHT WRIST: ICD-10-CM

## 2025-08-05 DIAGNOSIS — M25.532 PAIN IN RIGHT WRIST: ICD-10-CM

## 2025-08-05 DIAGNOSIS — M50.90 CERVICAL DISC DISORDER, UNSPECIFIED, UNSPECIFIED CERVICAL REGION: ICD-10-CM

## 2025-08-05 DIAGNOSIS — M17.0 BILATERAL PRIMARY OSTEOARTHRITIS OF KNEE: ICD-10-CM

## 2025-08-05 PROCEDURE — 99213 OFFICE O/P EST LOW 20 MIN: CPT

## 2025-08-28 ENCOUNTER — APPOINTMENT (OUTPATIENT)
Dept: PAIN MANAGEMENT | Facility: CLINIC | Age: 55
End: 2025-08-28

## 2025-09-08 ENCOUNTER — APPOINTMENT (OUTPATIENT)
Dept: PAIN MANAGEMENT | Facility: CLINIC | Age: 55
End: 2025-09-08
Payer: COMMERCIAL

## 2025-09-08 ENCOUNTER — APPOINTMENT (OUTPATIENT)
Dept: ORTHOPEDIC SURGERY | Facility: CLINIC | Age: 55
End: 2025-09-08
Payer: COMMERCIAL

## 2025-09-08 DIAGNOSIS — S63.592A OTHER SPECIFIED SPRAIN OF LEFT WRIST, INITIAL ENCOUNTER: ICD-10-CM

## 2025-09-08 DIAGNOSIS — M51.9 UNSPECIFIED THORACIC, THORACOLUMBAR AND LUMBOSACRAL INTERVERTEBRAL DISC DISORDER: ICD-10-CM

## 2025-09-08 DIAGNOSIS — S63.591A OTHER SPECIFIED SPRAIN OF RIGHT WRIST, INITIAL ENCOUNTER: ICD-10-CM

## 2025-09-08 DIAGNOSIS — M54.16 RADICULOPATHY, LUMBAR REGION: ICD-10-CM

## 2025-09-08 PROCEDURE — 99202 OFFICE O/P NEW SF 15 MIN: CPT

## 2025-09-08 PROCEDURE — 99214 OFFICE O/P EST MOD 30 MIN: CPT

## 2025-09-08 RX ORDER — MELOXICAM 15 MG/1
15 TABLET ORAL
Qty: 30 | Refills: 1 | Status: ACTIVE | COMMUNITY
Start: 2025-09-08 | End: 1900-01-01